# Patient Record
Sex: FEMALE | Race: BLACK OR AFRICAN AMERICAN | NOT HISPANIC OR LATINO | Employment: FULL TIME | URBAN - METROPOLITAN AREA
[De-identification: names, ages, dates, MRNs, and addresses within clinical notes are randomized per-mention and may not be internally consistent; named-entity substitution may affect disease eponyms.]

---

## 2018-12-13 LAB
ABO + RH BLD: NORMAL
HBV SURFACE AG SERPL QL IA: NEGATIVE
HCT VFR BLD AUTO: 33 % (ref 36–46)
HGB BLD-MCNC: 10.2 G/DL (ref 12–16)
HIV 1+2 AB+HIV1 P24 AG SERPL QL IA: NON REACTIVE
INDIRECT COOMBS: NORMAL
MCV RBC AUTO: 73 FL (ref 82–108)
PLATELET # BLD AUTO: 297 K/?L (ref 150–399)
RPR: NON REACTIVE
RUBELLA IMMUNE STATUS: 30.9
URINE CULTURE, ROUTINE: NEGATIVE

## 2019-05-01 LAB
C TRACH RRNA SPEC QL PROBE: NEGATIVE
N GONORRHOEAE, AMPLIFIED DNA: NEGATIVE
TSH SERPL DL<=0.005 MIU/L-ACNC: 1.96 UIU/ML (ref 0.41–5.9)

## 2019-05-07 LAB
GLUCOSE SERPL-MCNC: 90 MG/DL
HCT VFR BLD AUTO: 29 % (ref 36–46)
HGB BLD-MCNC: 8.8 G/DL (ref 12–16)
MCV RBC AUTO: 70 FL (ref 82–108)
PLATELET # BLD AUTO: 271 K/?L (ref 150–399)

## 2019-06-17 ENCOUNTER — TELEPHONE (OUTPATIENT)
Dept: OBSTETRICS AND GYNECOLOGY | Facility: CLINIC | Age: 34
End: 2019-06-17

## 2019-06-17 NOTE — TELEPHONE ENCOUNTER
Ramiro Rubio called saying that she is 34 weeks pregnant and she just moved to [facility]. Pt is wondering if she can come to Dr. Cordero. Pt said she found Dr. Cordero online, was not referred.

## 2019-06-17 NOTE — TELEPHONE ENCOUNTER
Pt is calling, back states that her pervious doctor's office is giving her the run around telling her it will be at least 2 wks for them to send records.Pt would like you to call her back.Pt # [phone]

## 2019-06-25 ENCOUNTER — INITIAL PRENATAL (OUTPATIENT)
Dept: OBSTETRICS AND GYNECOLOGY | Facility: CLINIC | Age: 34
End: 2019-06-25
Attending: OBSTETRICS & GYNECOLOGY
Payer: COMMERCIAL

## 2019-06-25 VITALS
DIASTOLIC BLOOD PRESSURE: 70 MMHG | BODY MASS INDEX: 31.88 KG/M2 | SYSTOLIC BLOOD PRESSURE: 110 MMHG | WEIGHT: 185.75 LBS

## 2019-06-25 DIAGNOSIS — Z3A.35 35 WEEKS GESTATION OF PREGNANCY: ICD-10-CM

## 2019-06-25 DIAGNOSIS — Z34.03 ENCOUNTER FOR SUPERVISION OF NORMAL FIRST PREGNANCY IN THIRD TRIMESTER: Primary | ICD-10-CM

## 2019-06-25 PROCEDURE — 99999 PR PBB SHADOW E&M-EST. PATIENT-LVL III: CPT | Mod: PBBFAC,,, | Performed by: OBSTETRICS & GYNECOLOGY

## 2019-06-25 PROCEDURE — 0502F SUBSEQUENT PRENATAL CARE: CPT | Mod: CPTII,S$GLB,, | Performed by: OBSTETRICS & GYNECOLOGY

## 2019-06-25 PROCEDURE — 99999 PR PBB SHADOW E&M-EST. PATIENT-LVL III: ICD-10-PCS | Mod: PBBFAC,,, | Performed by: OBSTETRICS & GYNECOLOGY

## 2019-06-25 PROCEDURE — 0502F PR SUBSEQUENT PRENATAL CARE: ICD-10-PCS | Mod: CPTII,S$GLB,, | Performed by: OBSTETRICS & GYNECOLOGY

## 2019-06-25 RX ORDER — PRENATAL WITH FERROUS FUM AND FOLIC ACID 3080; 920; 120; 400; 22; 1.84; 3; 20; 10; 1; 12; 200; 27; 25; 2 [IU]/1; [IU]/1; MG/1; [IU]/1; MG/1; MG/1; MG/1; MG/1; MG/1; MG/1; UG/1; MG/1; MG/1; MG/1; MG/1
TABLET ORAL
Refills: 0 | COMMUNITY
Start: 2019-05-14 | End: 2024-03-14

## 2019-06-25 NOTE — PROGRESS NOTES
Patient presents for OB visit with me. Moved from [place]. C/o hands feeling tight; has worn compression gloves with some relief. Has carpal tunnel issues. Also has feet swelling at end of the day; elevates feet which sometimes helps. Lots of fetal movement. Reviewed [facility] [place] [facility]. Reports her previous OB filled out disability forms for her to be on bedrest. Reviewed chart: reason for bedrest or reduction of work not indicated at this time. Discussed with patient that it's ok to work full time while pregnant in the absence of high risk issues. Reports works in [employer] and is on her feet. Advised to stay hydrated, elevate legs when can, try wrist braces for carpal tunnel issues. Verbalized understanding. Will see patient next week for GBBS culture, 3rd trimester labs, consents.

## 2019-06-25 NOTE — PROGRESS NOTES
Patient reports pain in hands. Hard to open and close. Also reports severe edema in both feet. Hard to walk at times

## 2019-07-02 ENCOUNTER — ROUTINE PRENATAL (OUTPATIENT)
Dept: OBSTETRICS AND GYNECOLOGY | Facility: CLINIC | Age: 34
End: 2019-07-02
Attending: OBSTETRICS & GYNECOLOGY
Payer: COMMERCIAL

## 2019-07-02 ENCOUNTER — LAB VISIT (OUTPATIENT)
Dept: LAB | Facility: OTHER | Age: 34
End: 2019-07-02
Attending: OBSTETRICS & GYNECOLOGY
Payer: COMMERCIAL

## 2019-07-02 VITALS — BODY MASS INDEX: 32.18 KG/M2 | DIASTOLIC BLOOD PRESSURE: 68 MMHG | WEIGHT: 187.5 LBS | SYSTOLIC BLOOD PRESSURE: 124 MMHG

## 2019-07-02 DIAGNOSIS — Z34.03 ENCOUNTER FOR SUPERVISION OF NORMAL FIRST PREGNANCY IN THIRD TRIMESTER: Primary | ICD-10-CM

## 2019-07-02 DIAGNOSIS — Z3A.36 36 WEEKS GESTATION OF PREGNANCY: ICD-10-CM

## 2019-07-02 PROCEDURE — 87081 CULTURE SCREEN ONLY: CPT

## 2019-07-02 PROCEDURE — 99999 PR PBB SHADOW E&M-EST. PATIENT-LVL III: CPT | Mod: PBBFAC,,, | Performed by: OBSTETRICS & GYNECOLOGY

## 2019-07-02 PROCEDURE — 86703 HIV-1/HIV-2 1 RESULT ANTBDY: CPT

## 2019-07-02 PROCEDURE — 86592 SYPHILIS TEST NON-TREP QUAL: CPT

## 2019-07-02 PROCEDURE — 0502F SUBSEQUENT PRENATAL CARE: CPT | Mod: CPTII,S$GLB,, | Performed by: OBSTETRICS & GYNECOLOGY

## 2019-07-02 PROCEDURE — 36415 COLL VENOUS BLD VENIPUNCTURE: CPT

## 2019-07-02 PROCEDURE — 99999 PR PBB SHADOW E&M-EST. PATIENT-LVL III: ICD-10-PCS | Mod: PBBFAC,,, | Performed by: OBSTETRICS & GYNECOLOGY

## 2019-07-02 PROCEDURE — 0502F PR SUBSEQUENT PRENATAL CARE: ICD-10-PCS | Mod: CPTII,S$GLB,, | Performed by: OBSTETRICS & GYNECOLOGY

## 2019-07-02 NOTE — PROGRESS NOTES
No new complaints. Still with some swelling. Lots of fetal movement. GBBS, labs, consents done. Labor/bleeding precautions given.

## 2019-07-03 LAB
HIV 1+2 AB+HIV1 P24 AG SERPL QL IA: NEGATIVE
RPR SER QL: NORMAL

## 2019-07-04 LAB — BACTERIA SPEC AEROBE CULT: NORMAL

## 2019-07-08 ENCOUNTER — ROUTINE PRENATAL (OUTPATIENT)
Dept: OBSTETRICS AND GYNECOLOGY | Facility: CLINIC | Age: 34
End: 2019-07-08
Attending: OBSTETRICS & GYNECOLOGY
Payer: COMMERCIAL

## 2019-07-08 VITALS
DIASTOLIC BLOOD PRESSURE: 74 MMHG | WEIGHT: 187.81 LBS | SYSTOLIC BLOOD PRESSURE: 118 MMHG | BODY MASS INDEX: 32.24 KG/M2

## 2019-07-08 DIAGNOSIS — Z34.03 ENCOUNTER FOR SUPERVISION OF NORMAL FIRST PREGNANCY IN THIRD TRIMESTER: Primary | ICD-10-CM

## 2019-07-08 DIAGNOSIS — Z3A.37 37 WEEKS GESTATION OF PREGNANCY: ICD-10-CM

## 2019-07-08 PROCEDURE — 0502F PR SUBSEQUENT PRENATAL CARE: ICD-10-PCS | Mod: CPTII,S$GLB,, | Performed by: OBSTETRICS & GYNECOLOGY

## 2019-07-08 PROCEDURE — 99999 PR PBB SHADOW E&M-EST. PATIENT-LVL II: ICD-10-PCS | Mod: PBBFAC,,, | Performed by: OBSTETRICS & GYNECOLOGY

## 2019-07-08 PROCEDURE — 0502F SUBSEQUENT PRENATAL CARE: CPT | Mod: CPTII,S$GLB,, | Performed by: OBSTETRICS & GYNECOLOGY

## 2019-07-08 PROCEDURE — 99999 PR PBB SHADOW E&M-EST. PATIENT-LVL II: CPT | Mod: PBBFAC,,, | Performed by: OBSTETRICS & GYNECOLOGY

## 2019-07-08 NOTE — PROGRESS NOTES
No complaints. Lost her mucus plug. Lots of fetal movement. Reviewed [facility]. Labor/bleeding precautions given.

## 2019-07-15 ENCOUNTER — TELEPHONE (OUTPATIENT)
Dept: OBSTETRICS AND GYNECOLOGY | Facility: CLINIC | Age: 34
End: 2019-07-15

## 2019-07-15 ENCOUNTER — ROUTINE PRENATAL (OUTPATIENT)
Dept: OBSTETRICS AND GYNECOLOGY | Facility: CLINIC | Age: 34
End: 2019-07-15
Attending: OBSTETRICS & GYNECOLOGY
Payer: COMMERCIAL

## 2019-07-15 VITALS
WEIGHT: 189.25 LBS | DIASTOLIC BLOOD PRESSURE: 70 MMHG | SYSTOLIC BLOOD PRESSURE: 118 MMHG | BODY MASS INDEX: 32.49 KG/M2

## 2019-07-15 DIAGNOSIS — Z34.03 ENCOUNTER FOR SUPERVISION OF NORMAL FIRST PREGNANCY IN THIRD TRIMESTER: Primary | ICD-10-CM

## 2019-07-15 DIAGNOSIS — Z3A.38 38 WEEKS GESTATION OF PREGNANCY: ICD-10-CM

## 2019-07-15 PROCEDURE — 99999 PR PBB SHADOW E&M-EST. PATIENT-LVL II: ICD-10-PCS | Mod: PBBFAC,,, | Performed by: OBSTETRICS & GYNECOLOGY

## 2019-07-15 PROCEDURE — 0502F SUBSEQUENT PRENATAL CARE: CPT | Mod: CPTII,S$GLB,, | Performed by: OBSTETRICS & GYNECOLOGY

## 2019-07-15 PROCEDURE — 0502F PR SUBSEQUENT PRENATAL CARE: ICD-10-PCS | Mod: CPTII,S$GLB,, | Performed by: OBSTETRICS & GYNECOLOGY

## 2019-07-15 PROCEDURE — 99999 PR PBB SHADOW E&M-EST. PATIENT-LVL II: CPT | Mod: PBBFAC,,, | Performed by: OBSTETRICS & GYNECOLOGY

## 2019-07-15 NOTE — PROGRESS NOTES
Had some tightening Saturday night that resolved with rest. Lots of fetal movement. Gave labor/bleeding precautions.

## 2019-07-15 NOTE — TELEPHONE ENCOUNTER
----- Message from Grace Cordero MD sent at 7/15/2019  3:11 PM CDT -----  [facility]KOLAY AND WIL:  PLEASE SEND DATE AND TIME TO [facility] TO PUT ON Remote AND EPIC AND TO [facility] TO PUT ON OB LIST  DON'T FORGET TO CALL PT AND LET HER KNOW ALSO#####          Procedure: answer Y where needed       Induction     Pitocin_Y____     Cytotec____     Balloon____     Other______         Primary____      Repeat____    BTL _____________    Cerclage _________       Indication (elective/other): elective, postdates    Date desired:   Time desired: 4:30 am    Due Date: 19    Cervical exam- (inductions only)   Dilation:            2   Effacement:      70    Station:           -3   Consistency:      soft   Position:   posterior      OSORIO SCORE____________

## 2019-07-19 ENCOUNTER — TELEPHONE (OUTPATIENT)
Dept: OBSTETRICS AND GYNECOLOGY | Facility: CLINIC | Age: 34
End: 2019-07-19

## 2019-07-19 NOTE — TELEPHONE ENCOUNTER
Dr. Cordero OB pt, 39 wks, has been experiencing watery discharge for 2 and half days. Called after hours and they told her it was normal. No blood in discharge, has been going on and off, no tightness no cramping, pt is feeling baby move. Please call pt and advise, thank you

## 2019-07-19 NOTE — TELEPHONE ENCOUNTER
Gil OB- 39w0d. For the past 2 days she has noticed an increase in vaginal discharge. Describes it as watery and clear. No odor. Denies irritation, itching. + fetal movement, no pain or discomfort. No cramping or contractions. Denies feeling any big gushes of fluid or any frequent leakage. Mostly feels it when she goes from sitting to standing.  Reassured pt that this is likely a normal increase in discharge that is common when pregnant. Advised to monitor and change her panty liner frequently and I will return call after speaking to Dr. Cordero

## 2019-07-21 ENCOUNTER — ANESTHESIA EVENT (OUTPATIENT)
Dept: OBSTETRICS AND GYNECOLOGY | Facility: OTHER | Age: 34
End: 2019-07-21
Payer: COMMERCIAL

## 2019-07-21 ENCOUNTER — ANESTHESIA (OUTPATIENT)
Dept: OBSTETRICS AND GYNECOLOGY | Facility: OTHER | Age: 34
End: 2019-07-21
Payer: COMMERCIAL

## 2019-07-21 ENCOUNTER — HOSPITAL ENCOUNTER (INPATIENT)
Facility: OTHER | Age: 34
LOS: 3 days | Discharge: HOME | End: 2019-07-24
Attending: OBSTETRICS & GYNECOLOGY | Admitting: OBSTETRICS & GYNECOLOGY
Payer: COMMERCIAL

## 2019-07-21 DIAGNOSIS — Z3A.39 PREGNANCY WITH 39 COMPLETED WEEKS GESTATION: ICD-10-CM

## 2019-07-21 LAB
ABO + RH BLD: NORMAL
ANISOCYTOSIS BLD QL SMEAR: ABNORMAL
BASOPHILS # BLD AUTO: 0.03 K/UL (ref 0–0.2)
BASOPHILS NFR BLD: 0.5 % (ref 0–1.9)
BLD GP AB SCN CELLS X3 SERPL QL: NORMAL
BLOOD GROUP ANTIBODIES SERPL: NORMAL
DIFFERENTIAL METHOD: ABNORMAL
EOSINOPHIL # BLD AUTO: 0.2 K/UL (ref 0–0.5)
EOSINOPHIL NFR BLD: 2.3 % (ref 0–8)
ERYTHROCYTE [DISTWIDTH] IN BLOOD BY AUTOMATED COUNT: 23.9 % (ref 11.5–14.5)
GIANT PLATELETS BLD QL SMEAR: PRESENT
HCT VFR BLD AUTO: 35.3 % (ref 37–48.5)
HGB BLD-MCNC: 10.6 G/DL (ref 12–16)
IMM GRANULOCYTES # BLD AUTO: 0.04 K/UL (ref 0–0.04)
IMM GRANULOCYTES NFR BLD AUTO: 0.6 % (ref 0–0.5)
LYMPHOCYTES # BLD AUTO: 1.2 K/UL (ref 1–4.8)
LYMPHOCYTES NFR BLD: 17.9 % (ref 18–48)
MCH RBC QN AUTO: 23.3 PG (ref 27–31)
MCHC RBC AUTO-ENTMCNC: 30 G/DL (ref 32–36)
MCV RBC AUTO: 78 FL (ref 82–98)
MONOCYTES # BLD AUTO: 0.6 K/UL (ref 0.3–1)
MONOCYTES NFR BLD: 8.7 % (ref 4–15)
NEUTROPHILS # BLD AUTO: 4.6 K/UL (ref 1.8–7.7)
NEUTROPHILS NFR BLD: 70 % (ref 38–73)
NRBC BLD-RTO: 0 /100 WBC
PLATELET # BLD AUTO: 157 K/UL (ref 150–350)
PLATELET BLD QL SMEAR: ABNORMAL
PMV BLD AUTO: ABNORMAL FL (ref 9.2–12.9)
RBC # BLD AUTO: 4.54 M/UL (ref 4–5.4)
WBC # BLD AUTO: 6.58 K/UL (ref 3.9–12.7)

## 2019-07-21 PROCEDURE — 63600175 PHARM REV CODE 636 W HCPCS: Performed by: OBSTETRICS & GYNECOLOGY

## 2019-07-21 PROCEDURE — 86902 BLOOD TYPE ANTIGEN DONOR EA: CPT

## 2019-07-21 PROCEDURE — 86901 BLOOD TYPING SEROLOGIC RH(D): CPT

## 2019-07-21 PROCEDURE — 25000003 PHARM REV CODE 250: Performed by: OBSTETRICS & GYNECOLOGY

## 2019-07-21 PROCEDURE — 59025 PR FETAL 2N-STRESS TEST: ICD-10-PCS | Mod: 26,,, | Performed by: OBSTETRICS & GYNECOLOGY

## 2019-07-21 PROCEDURE — 85025 COMPLETE CBC W/AUTO DIFF WBC: CPT

## 2019-07-21 PROCEDURE — 86905 BLOOD TYPING RBC ANTIGENS: CPT

## 2019-07-21 PROCEDURE — 99284 EMERGENCY DEPT VISIT MOD MDM: CPT | Mod: 25,,, | Performed by: OBSTETRICS & GYNECOLOGY

## 2019-07-21 PROCEDURE — 99284 PR EMERGENCY DEPT VISIT,LEVEL IV: ICD-10-PCS | Mod: 25,,, | Performed by: OBSTETRICS & GYNECOLOGY

## 2019-07-21 PROCEDURE — 59025 FETAL NON-STRESS TEST: CPT | Mod: 26,,, | Performed by: OBSTETRICS & GYNECOLOGY

## 2019-07-21 PROCEDURE — 86870 RBC ANTIBODY IDENTIFICATION: CPT

## 2019-07-21 PROCEDURE — 11000001 HC ACUTE MED/SURG PRIVATE ROOM

## 2019-07-21 RX ORDER — SODIUM CHLORIDE, SODIUM LACTATE, POTASSIUM CHLORIDE, CALCIUM CHLORIDE 600; 310; 30; 20 MG/100ML; MG/100ML; MG/100ML; MG/100ML
INJECTION, SOLUTION INTRAVENOUS CONTINUOUS
Status: DISCONTINUED | OUTPATIENT
Start: 2019-07-21 | End: 2019-07-22

## 2019-07-21 RX ORDER — OXYTOCIN/RINGER'S LACTATE 20/1000 ML
333 PLASTIC BAG, INJECTION (ML) INTRAVENOUS CONTINUOUS
Status: ACTIVE | OUTPATIENT
Start: 2019-07-21 | End: 2019-07-22

## 2019-07-21 RX ORDER — SODIUM CHLORIDE 9 MG/ML
INJECTION, SOLUTION INTRAVENOUS
Status: DISCONTINUED | OUTPATIENT
Start: 2019-07-21 | End: 2019-07-22

## 2019-07-21 RX ORDER — CEFAZOLIN SODIUM 2 G/50ML
2 SOLUTION INTRAVENOUS
Status: DISCONTINUED | OUTPATIENT
Start: 2019-07-21 | End: 2019-07-22

## 2019-07-21 RX ORDER — OXYTOCIN/RINGER'S LACTATE 20/1000 ML
41.7 PLASTIC BAG, INJECTION (ML) INTRAVENOUS CONTINUOUS
Status: DISCONTINUED | OUTPATIENT
Start: 2019-07-21 | End: 2019-07-22

## 2019-07-21 RX ORDER — ONDANSETRON 8 MG/1
8 TABLET, ORALLY DISINTEGRATING ORAL EVERY 8 HOURS PRN
Status: DISCONTINUED | OUTPATIENT
Start: 2019-07-21 | End: 2019-07-22

## 2019-07-21 RX ORDER — OXYTOCIN/RINGER'S LACTATE 20/1000 ML
2 PLASTIC BAG, INJECTION (ML) INTRAVENOUS CONTINUOUS
Status: DISCONTINUED | OUTPATIENT
Start: 2019-07-21 | End: 2019-07-22

## 2019-07-21 RX ADMIN — SODIUM CHLORIDE, SODIUM LACTATE, POTASSIUM CHLORIDE, AND CALCIUM CHLORIDE: .6; .31; .03; .02 INJECTION, SOLUTION INTRAVENOUS at 07:07

## 2019-07-21 RX ADMIN — OXYTOCIN 2 MILLI-UNITS/MIN: 10 INJECTION, SOLUTION INTRAMUSCULAR; INTRAVENOUS at 07:07

## 2019-07-21 NOTE — H&P
HISTORY AND PHYSICAL                                                OBSTETRICS          Subjective:       Ramiro Rubio is a 33 y.o.  female with IUP at 39w2d weeks gestation who presents to L&D for rupture of membranes about 1 hour ago.  Pt states she had a gush of clear fluid. Pertinent medical history for this pregnancy include routine prenatal care with Dr Cordero.  Patient denies contractions, denies vaginal bleeding, reports LOF.   Fetal Movement: normal.     ROS: 10 systems reviewed and negative other than HPI.     PMHx:   Past Medical History:   Diagnosis Date    Abnormal Pap smear of cervix        PSHx:   Past Surgical History:   Procedure Laterality Date    CRYOABLATION      LYMPH NODE BIOPSY         All: Review of patient's allergies indicates:  No Known Allergies    Meds:   (Not in a hospital admission)    SH:   Social History     Socioeconomic History    Marital status: Single     Spouse name: Not on file    Number of children: Not on file    Years of education: Not on file    Highest education level: Not on file   Occupational History    Not on file   Social Needs    Financial resource strain: Not on file    Food insecurity:     Worry: Not on file     Inability: Not on file    Transportation needs:     Medical: Not on file     Non-medical: Not on file   Tobacco Use    Smoking status: Never Smoker   Substance and Sexual Activity    Alcohol use: Not Currently    Drug use: No    Sexual activity: Yes     Partners: Male     Birth control/protection: None   Lifestyle    Physical activity:     Days per week: Not on file     Minutes per session: Not on file    Stress: Not on file   Relationships    Social connections:     Talks on phone: Not on file     Gets together: Not on file     Attends [employer] service: Not on file     Active member of club or organization: Not on file     Attends meetings of clubs or organizations: Not on file     Relationship status: Not on file   Other  Topics Concern    Not on file   Social History Narrative    Not on file       FH:   Family History   Problem Relation Age of Onset    Breast cancer Neg Hx     Colon cancer Neg Hx     Ovarian cancer Neg Hx        OBHx:   OB History    Para Term  AB Living   2 0 0 0 1 0   SAB TAB Ectopic Multiple Live Births   0 1 0 0 0      # Outcome Date GA Lbr El/2nd Weight Sex Delivery Anes PTL Lv   2 Current            1 TAB                Objective:       /60   Pulse 88   Temp 98.6 °F (37 °C) (Oral)   Resp 18   LMP 10/19/2018   SpO2 100%   Breastfeeding? No     Vitals:    19 1805   BP: 118/60   Pulse: 88   Resp: 18   Temp: 98.6 °F (37 °C)   TempSrc: Oral   SpO2: 100%       General:   alert and cooperative   Lungs:   clear to auscultation bilaterally; nonlabored    Heart:   regular rate and rhythm, 2+ pulses; trace pedal edema   Abdomen:  soft, non-tender; bowel sounds normal; gravid   Extremities negative edema, negative erythema   FHT: 140's Cat 1 (reassuring)                 TOCO: occasional contractions       Cervix:     Dilation: 2cm    Effacement: 75%    Station:  -2    Consistency: soft    Position: anterior     Prenatal record reviewed. GBS negative. Rh+; infection testing negative.   Consents for delivery and blood transfusion reviewed and signed.        Assessment:       39w2d weeks gestation.  With PROM at term    Active Hospital Problems    Diagnosis  POA     premature rupture of membranes with onset of labor within 24 hours of rupture in third trimester [O42.013]  Yes    Full-term premature rupture of membranes with onset of labor within 24 hours of rupture [O42.02]  Yes      Resolved Hospital Problems   No resolved problems to display.          Plan:      Risks, benefits, alternatives and possible complications have been discussed in detail with the patient.   - Consents signed and to chart  - Admit to Labor and Delivery unit  - Epidural per anesthesia.  - admit labs:  CBC, Type & Screen  - will start pitocin

## 2019-07-21 NOTE — ED PROVIDER NOTES
Encounter Date: 2019       History     Chief Complaint   Patient presents with    Rupture of Membranes     Ramiro Rubio is a 33 y.o. [id number] at 39w2d presents complaining of rupture of membranes at approximately 1730. She states that there was initially a small amount a fluid and then a large gush; the fluid has been clear with a red tinge.    This IUP has been uncomplicated. Patient denies contractions, denies vaginal bleeding, reports LOF. Fetal Movement: normal.        Review of patient's allergies indicates:  No Known Allergies  Past Medical History:   Diagnosis Date    Abnormal Pap smear of cervix      Past Surgical History:   Procedure Laterality Date    CRYOABLATION      LYMPH NODE BIOPSY       Family History   Problem Relation Age of Onset    Breast cancer Neg Hx     Colon cancer Neg Hx     Ovarian cancer Neg Hx      Social History     Tobacco Use    Smoking status: Never Smoker   Substance Use Topics    Alcohol use: Not Currently    Drug use: No     Review of Systems   Constitutional: Negative for chills, fatigue and fever.   HENT: Negative for congestion.    Eyes: Negative for visual disturbance.   Respiratory: Negative for cough, chest tightness and shortness of breath.    Cardiovascular: Negative for chest pain and leg swelling.   Gastrointestinal: Negative for abdominal pain, nausea and vomiting.   Genitourinary: Positive for vaginal discharge (Leakage of clear fluid). Negative for dysuria, pelvic pain and vaginal bleeding.   Musculoskeletal: Negative for back pain.   Skin: Negative for color change and rash.   Neurological: Negative for headaches.       Physical Exam     Initial Vitals [19 1805]   BP Pulse Resp Temp SpO2   118/60 88 18 98.6 °F (37 °C) 100 %      MAP       --         Physical Exam    Vitals reviewed.  Constitutional: She appears well-developed and well-nourished. No distress.   HENT:   Head: Normocephalic and atraumatic.   Eyes: Conjunctivae and EOM are normal.    Neck: Normal range of motion. Neck supple.   Cardiovascular: Normal rate, regular rhythm and normal heart sounds.   Pulmonary/Chest: Breath sounds normal. No respiratory distress. She has no wheezes.   Abdominal: Soft. There is no tenderness.   Gravid uterus, size appropriate for dates.   Genitourinary:   Genitourinary Comments:   SSE: grossly ruptured with clear fluid; + valsalva, + pooling, NZT+, ferning+  SVE: 2/70/-3   Musculoskeletal: Normal range of motion. Edema: Trace.   Neurological: She is alert and oriented to person, place, and time.   Skin: Skin is warm and dry.   Psychiatric: She has a normal mood and affect. Her behavior is normal. Judgment and thought content normal.         ED Course   Obtain Fetal nonstress test (NST)  Date/Time: 2019 6:49 PM  Performed by: Georgia De La Rosa MD  Authorized by: Nara Terrell MD     Nonstress Test:     Variability:  6-25 BPM    Decelerations:  None    Accelerations:  15 bpm    Baseline:  145    Uterine Irritability: Yes      Contractions:  Not present  Biophysical Profile:     Nonstress Test Interpretation: reactive      Overall Impression:  Reassuring  Post-procedure:     Patient tolerance:  Patient tolerated the procedure well with no immediate complications      Labs Reviewed   CBC W/ AUTO DIFFERENTIAL   TYPE AND SCREEN LABOR & DELIVERY          Imaging Results    None     BSUS: vertex presentation of fetus       Medical Decision Making:   Initial Assessment:   Ramiro Rubio is a 33 y.o. [id number] at 39w2d presents complaining of rupture of membranes at approximately 1730.    ED Management:   Risks, benefits, alternatives and possible complications have been discussed in detail with the patient.   - Consents signed and to chart  - Admit to Labor and Delivery unit  - BSUS vtx  - Epidural per anesthesia.  - Admit labs: CBC, Type & Screen  - GBS -  - Expectant management on L&D    Other:   I have discussed this case with another health care  provider.                      Clinical Impression:       ICD-10-CM ICD-9-CM   1. Full-term premature rupture of membranes with onset of labor within 24 hours of rupture O42.02 658.10   2. Pregnancy with 39 completed weeks gestation Z3A.39 V22.2            Georgia De La Rosa M.D.  OB/GYN PGY-1                      Georgia De La Rosa MD  Resident  07/21/19 1692

## 2019-07-21 NOTE — ED TRIAGE NOTES
Pt. Presented to the [facility] with complaints of her water breaking around 1725.Pt. States that it was a small amount a fluid and then a large gush, and the fluid had a tinge of red. Pt. Has no other complaints at this time. MD notified. TM.

## 2019-07-22 PROBLEM — Z3A.39 39 WEEKS GESTATION OF PREGNANCY: Status: RESOLVED | Noted: 2019-07-21 | Resolved: 2019-07-22

## 2019-07-22 PROCEDURE — 25000003 PHARM REV CODE 250: Performed by: SURGERY

## 2019-07-22 PROCEDURE — 63600175 PHARM REV CODE 636 W HCPCS: Performed by: OBSTETRICS & GYNECOLOGY

## 2019-07-22 PROCEDURE — 27800517 HC TRAY,EPIDURAL-CONTINUOUS: Performed by: SURGERY

## 2019-07-22 PROCEDURE — 59409 PRA ETRICAL CARE,VAG DELIV ONLY: ICD-10-PCS | Mod: QY,,, | Performed by: ANESTHESIOLOGY

## 2019-07-22 PROCEDURE — 25000003 PHARM REV CODE 250: Performed by: OBSTETRICS & GYNECOLOGY

## 2019-07-22 PROCEDURE — 72100002 HC LABOR CARE, 1ST 8 HOURS

## 2019-07-22 PROCEDURE — 72100003 HC LABOR CARE, EA. ADDL. 8 HRS

## 2019-07-22 PROCEDURE — 72200005 HC VAGINAL DELIVERY LEVEL II

## 2019-07-22 PROCEDURE — 25000003 PHARM REV CODE 250: Performed by: ANESTHESIOLOGY

## 2019-07-22 PROCEDURE — 51702 INSERT TEMP BLADDER CATH: CPT

## 2019-07-22 PROCEDURE — 62326 NJX INTERLAMINAR LMBR/SAC: CPT | Performed by: SURGERY

## 2019-07-22 PROCEDURE — 59409 OBSTETRICAL CARE: CPT | Mod: QY,,, | Performed by: ANESTHESIOLOGY

## 2019-07-22 PROCEDURE — 27200710 HC EPIDURAL INFUSION PUMP SET: Performed by: SURGERY

## 2019-07-22 PROCEDURE — 11000001 HC ACUTE MED/SURG PRIVATE ROOM

## 2019-07-22 RX ORDER — FENTANYL/BUPIVACAINE/NS/PF 2MCG/ML-.1
PLASTIC BAG, INJECTION (ML) INJECTION CONTINUOUS
Status: DISCONTINUED | OUTPATIENT
Start: 2019-07-22 | End: 2019-07-22

## 2019-07-22 RX ORDER — DIPHENHYDRAMINE HCL 25 MG
25 CAPSULE ORAL EVERY 4 HOURS PRN
Status: DISCONTINUED | OUTPATIENT
Start: 2019-07-22 | End: 2019-07-24 | Stop reason: HOSPADM

## 2019-07-22 RX ORDER — FENTANYL/BUPIVACAINE/NS/PF 2MCG/ML-.1
PLASTIC BAG, INJECTION (ML) INJECTION
Status: DISPENSED
Start: 2019-07-22 | End: 2019-07-23

## 2019-07-22 RX ORDER — CARBOPROST TROMETHAMINE 250 UG/ML
250 INJECTION, SOLUTION INTRAMUSCULAR
Status: DISCONTINUED | OUTPATIENT
Start: 2019-07-22 | End: 2019-07-22

## 2019-07-22 RX ORDER — FAMOTIDINE 10 MG/ML
20 INJECTION INTRAVENOUS ONCE
Status: DISCONTINUED | OUTPATIENT
Start: 2019-07-22 | End: 2019-07-22

## 2019-07-22 RX ORDER — MISOPROSTOL 200 UG/1
600 TABLET ORAL
Status: DISCONTINUED | OUTPATIENT
Start: 2019-07-22 | End: 2019-07-22

## 2019-07-22 RX ORDER — ACETAMINOPHEN 325 MG/1
650 TABLET ORAL EVERY 6 HOURS PRN
Status: DISCONTINUED | OUTPATIENT
Start: 2019-07-22 | End: 2019-07-24 | Stop reason: HOSPADM

## 2019-07-22 RX ORDER — FENTANYL/BUPIVACAINE/NS/PF 2MCG/ML-.1
PLASTIC BAG, INJECTION (ML) INJECTION CONTINUOUS PRN
Status: DISCONTINUED | OUTPATIENT
Start: 2019-07-22 | End: 2019-07-24

## 2019-07-22 RX ORDER — DIPHENOXYLATE HYDROCHLORIDE AND ATROPINE SULFATE 2.5; .025 MG/1; MG/1
2 TABLET ORAL EVERY 6 HOURS PRN
Status: DISCONTINUED | OUTPATIENT
Start: 2019-07-22 | End: 2019-07-22

## 2019-07-22 RX ORDER — DIPHENOXYLATE HYDROCHLORIDE AND ATROPINE SULFATE 2.5; .025 MG/1; MG/1
2 TABLET ORAL 4 TIMES DAILY PRN
Status: DISCONTINUED | OUTPATIENT
Start: 2019-07-22 | End: 2019-07-24 | Stop reason: HOSPADM

## 2019-07-22 RX ORDER — OXYTOCIN/RINGER'S LACTATE 20/1000 ML
41.65 PLASTIC BAG, INJECTION (ML) INTRAVENOUS CONTINUOUS
Status: ACTIVE | OUTPATIENT
Start: 2019-07-22 | End: 2019-07-23

## 2019-07-22 RX ORDER — OXYCODONE AND ACETAMINOPHEN 10; 325 MG/1; MG/1
1 TABLET ORAL EVERY 4 HOURS PRN
Status: DISCONTINUED | OUTPATIENT
Start: 2019-07-22 | End: 2019-07-24 | Stop reason: HOSPADM

## 2019-07-22 RX ORDER — BUPIVACAINE HYDROCHLORIDE 2.5 MG/ML
INJECTION, SOLUTION EPIDURAL; INFILTRATION; INTRACAUDAL
Status: DISPENSED
Start: 2019-07-22 | End: 2019-07-22

## 2019-07-22 RX ORDER — METHYLERGONOVINE MALEATE 0.2 MG/ML
200 INJECTION INTRAVENOUS
Status: DISCONTINUED | OUTPATIENT
Start: 2019-07-22 | End: 2019-07-22

## 2019-07-22 RX ORDER — ONDANSETRON 8 MG/1
8 TABLET, ORALLY DISINTEGRATING ORAL EVERY 8 HOURS PRN
Status: DISCONTINUED | OUTPATIENT
Start: 2019-07-22 | End: 2019-07-24 | Stop reason: HOSPADM

## 2019-07-22 RX ORDER — HYDROCORTISONE 25 MG/G
CREAM TOPICAL 3 TIMES DAILY PRN
Status: DISCONTINUED | OUTPATIENT
Start: 2019-07-22 | End: 2019-07-24 | Stop reason: HOSPADM

## 2019-07-22 RX ORDER — MISOPROSTOL 200 UG/1
200 TABLET ORAL EVERY 6 HOURS
Status: COMPLETED | OUTPATIENT
Start: 2019-07-23 | End: 2019-07-23

## 2019-07-22 RX ORDER — SIMETHICONE 80 MG
1 TABLET,CHEWABLE ORAL 3 TIMES DAILY PRN
Status: DISCONTINUED | OUTPATIENT
Start: 2019-07-22 | End: 2019-07-24 | Stop reason: HOSPADM

## 2019-07-22 RX ORDER — DIPHENHYDRAMINE HYDROCHLORIDE 50 MG/ML
25 INJECTION INTRAMUSCULAR; INTRAVENOUS EVERY 4 HOURS PRN
Status: DISCONTINUED | OUTPATIENT
Start: 2019-07-22 | End: 2019-07-24 | Stop reason: HOSPADM

## 2019-07-22 RX ORDER — SODIUM CITRATE AND CITRIC ACID MONOHYDRATE 334; 500 MG/5ML; MG/5ML
30 SOLUTION ORAL ONCE
Status: DISCONTINUED | OUTPATIENT
Start: 2019-07-22 | End: 2019-07-22

## 2019-07-22 RX ORDER — DOCUSATE SODIUM 100 MG/1
200 CAPSULE, LIQUID FILLED ORAL 2 TIMES DAILY PRN
Status: DISCONTINUED | OUTPATIENT
Start: 2019-07-22 | End: 2019-07-24 | Stop reason: HOSPADM

## 2019-07-22 RX ORDER — FENTANYL/BUPIVACAINE/NS/PF 2MCG/ML-.1
PLASTIC BAG, INJECTION (ML) INJECTION
Status: DISPENSED
Start: 2019-07-22 | End: 2019-07-22

## 2019-07-22 RX ORDER — OXYCODONE AND ACETAMINOPHEN 5; 325 MG/1; MG/1
1 TABLET ORAL EVERY 4 HOURS PRN
Status: DISCONTINUED | OUTPATIENT
Start: 2019-07-22 | End: 2019-07-24 | Stop reason: HOSPADM

## 2019-07-22 RX ORDER — MISOPROSTOL 200 UG/1
200 TABLET ORAL EVERY 6 HOURS
Status: DISCONTINUED | OUTPATIENT
Start: 2019-07-22 | End: 2019-07-22

## 2019-07-22 RX ORDER — IBUPROFEN 600 MG/1
600 TABLET ORAL EVERY 6 HOURS PRN
Status: DISCONTINUED | OUTPATIENT
Start: 2019-07-22 | End: 2019-07-24 | Stop reason: HOSPADM

## 2019-07-22 RX ADMIN — Medication 250 ML: at 02:07

## 2019-07-22 RX ADMIN — DIPHENOXYLATE HYDROCHLORIDE AND ATROPINE SULFATE 2 TABLET: 2.5; .025 TABLET ORAL at 06:07

## 2019-07-22 RX ADMIN — Medication 333 MILLI-UNITS/MIN: at 06:07

## 2019-07-22 RX ADMIN — OXYTOCIN 2 MILLI-UNITS/MIN: 10 INJECTION, SOLUTION INTRAMUSCULAR; INTRAVENOUS at 04:07

## 2019-07-22 RX ADMIN — METHYLERGONOVINE MALEATE 200 MCG: 0.2 INJECTION, SOLUTION INTRAMUSCULAR; INTRAVENOUS at 06:07

## 2019-07-22 RX ADMIN — Medication 10 ML/HR: at 12:07

## 2019-07-22 RX ADMIN — MISOPROSTOL 600 MCG: 200 TABLET ORAL at 06:07

## 2019-07-22 RX ADMIN — PROMETHAZINE HYDROCHLORIDE 25 MG: 25 INJECTION INTRAMUSCULAR; INTRAVENOUS at 08:07

## 2019-07-22 RX ADMIN — CARBOPROST TROMETHAMINE 250 MCG: 250 INJECTION, SOLUTION INTRAMUSCULAR at 06:07

## 2019-07-22 RX ADMIN — LIDOCAINE HYDROCHLORIDE AND EPINEPHRINE 3 ML: 15; 5 INJECTION, SOLUTION EPIDURAL at 12:07

## 2019-07-22 RX ADMIN — OXYCODONE HYDROCHLORIDE AND ACETAMINOPHEN 1 TABLET: 10; 325 TABLET ORAL at 08:07

## 2019-07-22 RX ADMIN — DIPHENOXYLATE HYDROCHLORIDE AND ATROPINE SULFATE 2 TABLET: 2.5; .025 TABLET ORAL at 08:07

## 2019-07-22 RX ADMIN — ACETAMINOPHEN 650 MG: 325 TABLET, FILM COATED ORAL at 07:07

## 2019-07-22 NOTE — PROGRESS NOTES
L&D    Patient is an IUP at 39 2/7 weeks in labor. Membranes are ruptured. She is comfortable with her epidural. Pitocin is at 22 mU/min.    P.E.  Gen: AAO x 3, NAD  Vitals:    07/22/19 0829 07/22/19 0834 07/22/19 0839 07/22/19 0844   BP:       Pulse: 83 85 85 85   Resp:       Temp:       TempSrc:       SpO2: 100% 99% 99% 99%   Weight:       Height:         SVE: 6/90/-1  FHTS: 140, moderate variability, no decels, overall reassuring  [place]: q 2 min    A: IUP at 39 2/7 weeks, labor    P: pitocin prn      continue to monitor

## 2019-07-22 NOTE — PROGRESS NOTES
07/22/19 0740   Tele[facility] Note - Strip   Strip Reviewed by [facility] Nurse? Yes   [facility] Note - Communication   [place] Nurse Communicated with Bedside Nurse Regarding: Fetal Status   Tele[facility] Note - Notification   Nurse Notified? Yes   Name of Nurse JIM Brown

## 2019-07-22 NOTE — ANESTHESIA PREPROCEDURE EVALUATION
Ramiro Rubio is a 33 y.o.  female with no significant past medical history who presented with rupture of membranes at approximately 1730.   Patient denies problems with anesthesia, bleeding diatheses, recent blood thinner use.  Platelet count 157.  Patient is interested in having an epidural placed.     OB History    Para Term  AB Living   2 0     1     SAB TAB Ectopic Multiple Live Births     1            # Outcome Date GA Lbr El/2nd Weight Sex Delivery Anes PTL Lv   2 Current            1 TAB                Wt Readings from Last 1 Encounters:   19 1838 81.6 kg (180 lb)       BP Readings from Last 3 Encounters:   19 113/71   19 (!) 101/57   07/15/19 118/70       Patient Active Problem List   Diagnosis    Full-term premature rupture of membranes with onset of labor within 24 hours of rupture    39 weeks gestation of pregnancy       Past Surgical History:   Procedure Laterality Date    CRYOABLATION      LYMPH NODE BIOPSY         Social History     Socioeconomic History    Marital status: Single     Spouse name: Not on file    Number of children: Not on file    Years of education: Not on file    Highest education level: Not on file   Occupational History    Not on file   Social Needs    Financial resource strain: Not on file    Food insecurity:     Worry: Not on file     Inability: Not on file    Transportation needs:     Medical: Not on file     Non-medical: Not on file   Tobacco Use    Smoking status: Never Smoker   Substance and Sexual Activity    Alcohol use: Not Currently    Drug use: No    Sexual activity: Yes     Partners: Male     Birth control/protection: None   Lifestyle    Physical activity:     Days per week: Not on file     Minutes per session: Not on file    Stress: Not on file   Relationships    Social connections:     Talks on phone: Not on file     Gets together: Not on file     Attends Orthodox service: Not on file     Active  member of club or organization: Not on file     Attends meetings of clubs or organizations: Not on file     Relationship status: Not on file   Other Topics Concern    Not on file   Social History Narrative    Not on file         Chemistry        Component Value Date/Time     11/10/2016 1205    K 3.8 11/10/2016 1205     11/10/2016 1205    CO2 25 11/10/2016 1205    BUN 6 11/10/2016 1205    CREATININE 0.7 11/10/2016 1205    GLU 80 11/10/2016 1205        Component Value Date/Time    CALCIUM 8.9 11/10/2016 1205    ALKPHOS 56 11/10/2016 1205    AST 20 11/10/2016 1205    ALT 11 11/10/2016 1205    BILITOT 0.9 11/10/2016 1205    ESTGFRAFRICA >60.0 11/10/2016 1205    EGFRNONAA >60.0 11/10/2016 1205            Lab Results   Component Value Date    WBC 6.58 07/21/2019    HGB 10.6 (L) 07/21/2019    HCT 35.3 (L) 07/21/2019    MCV 78 (L) 07/21/2019     07/21/2019       No results for input(s): PT, INR, PROTIME, APTT in the last 72 hours.      Anesthesia Evaluation    I have reviewed the Patient Summary Reports.    I have reviewed the Nursing Notes.   I have reviewed the Medications.     Review of Systems  Anesthesia Hx:  No previous Anesthesia   Denies Personal Hx of Anesthesia complications.   Social:  Non-Smoker    Hematology/Oncology:     Oncology Normal    -- Denies Anemia:   EENT/Dental:EENT/Dental Normal   Cardiovascular:   Exercise tolerance: good Denies Hypertension.     Pulmonary:   Denies COPD.  Denies Asthma.    Renal/:  Renal/ Normal     Hepatic/GI:  Hepatic/GI Normal    Musculoskeletal:  Musculoskeletal Normal    Neurological:  Neurology Normal Denies TIA. Denies Seizures.    Endocrine:   Denies Diabetes. Denies Hypothyroidism.    Psych:  Psychiatric Normal           Physical Exam  General:  Well nourished    Airway/Jaw/Neck:  Airway Findings: Mouth Opening: Normal General Airway Assessment: Adult  Mallampati: II     Eyes/Ears/Nose:  Eyes/Ears/Nose Findings: EOMI, mucus membranes moist     Dental:  Dental Findings: In tact   Chest/Lungs:  Chest/Lungs Findings: Clear to auscultation, Normal Respiratory Rate     Heart/Vascular:  Heart Findings: Rate: Normal  Rhythm: Regular Rhythm  Heart murmur: negative    Abdomen:  Abdomen Findings: Normal    Musculoskeletal:  Musculoskeletal Findings: Normal   Skin:  Skin Findings: Normal    Mental Status:  Mental Status Findings:  Cooperative, Alert and Oriented         Anesthesia Plan  Type of Anesthesia, risks & benefits discussed:  Anesthesia Type:  CSE, epidural, general, spinal  Patient's Preference:   Intra-op Monitoring Plan: standard ASA monitors  Intra-op Monitoring Plan Comments:   Post Op Pain Control Plan: per primary service following discharge from PACU, IV/PO Opioids PRN, multimodal analgesia, epidural analgesia and intrathecal opioid  Post Op Pain Control Plan Comments:   Induction:   IV  Beta Blocker:  Patient is not currently on a Beta-Blocker (No further documentation required).       Informed Consent: Patient understands risks and agrees with Anesthesia plan.  Questions answered. Anesthesia consent signed with patient.  ASA Score: 2     Day of Surgery Review of History & Physical:    H&P update referred to the surgeon.         Ready For Surgery From Anesthesia Perspective.

## 2019-07-22 NOTE — PROGRESS NOTES
Ramiro foxinucco to do well.  Nurse would like IUPC due to pit at 24 and not seeing contractions    IUPC placed w/o difficulty  7.5/90/-1  Pit at 24    Will continue to monitor, recheck in 2h or prn    Tanna Arriola,

## 2019-07-22 NOTE — PROGRESS NOTES
Labor Progress Note        Subjective:      Patient currently doing well without complaints    Objective:      Temp:  [96.4 °F (35.8 °C)-98.6 °F (37 °C)] 97.8 °F (36.6 °C)  Pulse:  [] 99  Resp:  [18] 18  SpO2:  [98 %-100 %] 99 %  BP: ()/(54-85) 120/69  Body mass index is 30.9 kg/m².     General: no acute distress  Electronic Fetal Monitoring:  FHT: 135 bpm, moderate variability, accelerations absent, decelerations absent   Category: 1                 TOCO: Contractions: regular, every 2-5 minutes     Cervix 9.5/90/-1     Assessment:     1. IUP at 39.3 here for PROM at 1730 yesterday  2. GBS negative  3. Category 2, overall reassuring FHT  4. Epidural: yes  5. IUPC in place     Plan:      1. Continue active management of labor  2. Recheck in 2 hours or prn     Tanna Arriola DO

## 2019-07-22 NOTE — PROGRESS NOTES
Labor Progress Note        Subjective:      Patient currently doing well without complaints    Objective:      Temp:  [96.4 °F (35.8 °C)-98.6 °F (37 °C)] 97.8 °F (36.6 °C)  Pulse:  [71-98] 95  Resp:  [18] 18  SpO2:  [98 %-100 %] 100 %  BP: ()/(54-85) 126/77  Body mass index is 30.9 kg/m².     General: no acute distress  Electronic Fetal Monitoring:  FHT: 130 bpm, moderate variability, accelerations absent, decelerations occ variable  Category: 2, overall reassuring                 TOCO: Contractions: regular, every 2-3 minutes     9/90/-1     Assessment:     1. IUP at 39.3 here for PROM at 1730 yesterday  2. GBS negative  3. Category 2, overall reassuring FHT  4. Epidural: yes  5. IUPC in place     Plan:     1. Continue active management of labor  2. Recheck in 2 hours or prn    Tanna Arriola DO

## 2019-07-22 NOTE — ANESTHESIA PROCEDURE NOTES
Epidural    Patient location during procedure: OB   Reason for block: primary anesthetic   Diagnosis: Intrauterine pregnancy   Start time: 7/22/2019 12:21 AM  Timeout: 7/22/2019 12:20 AM  End time: 7/22/2019 12:35 AM    Staffing  Performing Provider: Darshan Mike MD  Authorizing Provider: Sg Ma MD        Preanesthetic Checklist  Completed: patient identified, site marked, surgical consent, pre-op evaluation, timeout performed, IV checked, risks and benefits discussed, monitors and equipment checked, anesthesia consent given, hand hygiene performed and patient being monitored  Preparation  Patient position: sitting  Prep: ChloraPrep  Patient monitoring: ECG and continuous capnometry  Epidural  Skin Anesthetic: lidocaine 1%  Skin Wheal: 3 mL  Administration type: continuous  Approach: midline  Interspace: L3-4    Needle and Epidural Catheter  Needle type: Tuohy   Needle gauge: 17  Needle length: 3.5 inches  Needle insertion depth: 7 cm  Catheter size: 19 G  Catheter at skin depth: 11 cm  Test dose: 3 mL of lidocaine 1.5% with Epi 1-to-200,000  Additional Documentation: incremental injection, negative aspiration for heme and CSF, no paresthesia on injection, left transient paresthesia, no signs/symptoms of IV or SA injection, no significant pain on injection and no significant complaints from patient  Needle localization: anatomical landmarks  Assessment  Ease of block: easy  Patient's tolerance of the procedure: comfortable throughout blockNo inadvertent dural puncture with Tuohy.  Dural puncture performed with spinal needle.

## 2019-07-22 NOTE — PROGRESS NOTES
LABOR PROGRESS NOTE    S:  MD to bedside for labor check. Complaints: No.  Comfortable with epidural    O: Temp:  [96.4 °F (35.8 °C)-98.6 °F (37 °C)] 96.4 °F (35.8 °C)  Pulse:  [76-98] 78  Resp:  [18] 18  SpO2:  [99 %-100 %] 100 %  BP: (108-134)/(56-85) 114/62      FHT:135 BPM/+ moderate beat to beat variability/+ accels/ no decels Cat 1 (reassuring)  CTX: q 2-4 minutes, pitocin 22  SVE: /-2        ASSESSMENT:   33 y.o.  at 39w3d, for PROM at term     FHT reassuring    Active Hospital Problems    Diagnosis  POA    *Full-term premature rupture of membranes with onset of labor within 24 hours of rupture [O42.02]  Yes    39 weeks gestation of pregnancy [Z3A.39]  Not Applicable      Resolved Hospital Problems   No resolved problems to display.         PLAN:    Labor management  Continue Close Maternal/Fetal Monitoring.   Pitocin Augmentation per protocol,   Recheck 2 hours or PRN      Nara Terrell MD

## 2019-07-22 NOTE — PROGRESS NOTES
Patient transferred to Labor and Delivery via wheelchair.   Patient accompanied by RN and family member. No apparent distress noted.

## 2019-07-23 PROBLEM — D62 ACUTE BLOOD LOSS ANEMIA: Status: ACTIVE | Noted: 2019-07-23

## 2019-07-23 LAB
ANISOCYTOSIS BLD QL SMEAR: ABNORMAL
BASOPHILS # BLD AUTO: 0.04 K/UL (ref 0–0.2)
BASOPHILS NFR BLD: 0.2 % (ref 0–1.9)
DIFFERENTIAL METHOD: ABNORMAL
EOSINOPHIL # BLD AUTO: 0 K/UL (ref 0–0.5)
EOSINOPHIL NFR BLD: 0.2 % (ref 0–8)
ERYTHROCYTE [DISTWIDTH] IN BLOOD BY AUTOMATED COUNT: 23.6 % (ref 11.5–14.5)
GIANT PLATELETS BLD QL SMEAR: PRESENT
HCT VFR BLD AUTO: 29 % (ref 37–48.5)
HGB BLD-MCNC: 8.6 G/DL (ref 12–16)
IMM GRANULOCYTES # BLD AUTO: 0.09 K/UL (ref 0–0.04)
IMM GRANULOCYTES NFR BLD AUTO: 0.5 % (ref 0–0.5)
LYMPHOCYTES # BLD AUTO: 1.5 K/UL (ref 1–4.8)
LYMPHOCYTES NFR BLD: 8.1 % (ref 18–48)
MCH RBC QN AUTO: 23.7 PG (ref 27–31)
MCHC RBC AUTO-ENTMCNC: 29.7 G/DL (ref 32–36)
MCV RBC AUTO: 80 FL (ref 82–98)
MONOCYTES # BLD AUTO: 1.5 K/UL (ref 0.3–1)
MONOCYTES NFR BLD: 8.3 % (ref 4–15)
NEUTROPHILS # BLD AUTO: 15.2 K/UL (ref 1.8–7.7)
NEUTROPHILS NFR BLD: 82.7 % (ref 38–73)
NRBC BLD-RTO: 0 /100 WBC
OVALOCYTES BLD QL SMEAR: ABNORMAL
PLATELET # BLD AUTO: 159 K/UL (ref 150–350)
PLATELET BLD QL SMEAR: ABNORMAL
PMV BLD AUTO: ABNORMAL FL (ref 9.2–12.9)
POIKILOCYTOSIS BLD QL SMEAR: SLIGHT
RBC # BLD AUTO: 3.63 M/UL (ref 4–5.4)
WBC # BLD AUTO: 18.36 K/UL (ref 3.9–12.7)

## 2019-07-23 PROCEDURE — 85025 COMPLETE CBC W/AUTO DIFF WBC: CPT

## 2019-07-23 PROCEDURE — 59400 PR FULL ROUT OBSTE CARE,VAGINAL DELIV: ICD-10-PCS | Mod: GB,,, | Performed by: OBSTETRICS & GYNECOLOGY

## 2019-07-23 PROCEDURE — 99024 POSTOP FOLLOW-UP VISIT: CPT | Mod: ,,, | Performed by: OBSTETRICS & GYNECOLOGY

## 2019-07-23 PROCEDURE — 59400 OBSTETRICAL CARE: CPT | Mod: GB,,, | Performed by: OBSTETRICS & GYNECOLOGY

## 2019-07-23 PROCEDURE — 99024 PR POST-OP FOLLOW-UP VISIT: ICD-10-PCS | Mod: ,,, | Performed by: OBSTETRICS & GYNECOLOGY

## 2019-07-23 PROCEDURE — 25000003 PHARM REV CODE 250: Performed by: OBSTETRICS & GYNECOLOGY

## 2019-07-23 PROCEDURE — 36415 COLL VENOUS BLD VENIPUNCTURE: CPT

## 2019-07-23 PROCEDURE — 11000001 HC ACUTE MED/SURG PRIVATE ROOM

## 2019-07-23 RX ORDER — IBUPROFEN 600 MG/1
600 TABLET ORAL EVERY 6 HOURS PRN
Qty: 60 TABLET | Refills: 2 | Status: SHIPPED | OUTPATIENT
Start: 2019-07-23 | End: 2024-03-14

## 2019-07-23 RX ORDER — PRENATAL WITH FERROUS FUM AND FOLIC ACID 3080; 920; 120; 400; 22; 1.84; 3; 20; 10; 1; 12; 200; 27; 25; 2 [IU]/1; [IU]/1; MG/1; [IU]/1; MG/1; MG/1; MG/1; MG/1; MG/1; MG/1; UG/1; MG/1; MG/1; MG/1; MG/1
1 TABLET ORAL DAILY
Status: DISCONTINUED | OUTPATIENT
Start: 2019-07-23 | End: 2019-07-24 | Stop reason: HOSPADM

## 2019-07-23 RX ORDER — DOCUSATE SODIUM 100 MG/1
200 CAPSULE, LIQUID FILLED ORAL 2 TIMES DAILY PRN
Refills: 0 | COMMUNITY
Start: 2019-07-23 | End: 2024-03-14

## 2019-07-23 RX ORDER — OXYCODONE AND ACETAMINOPHEN 5; 325 MG/1; MG/1
1 TABLET ORAL EVERY 4 HOURS PRN
Qty: 15 TABLET | Refills: 0 | Status: SHIPPED | OUTPATIENT
Start: 2019-07-23 | End: 2019-09-16

## 2019-07-23 RX ADMIN — MISOPROSTOL 200 MCG: 200 TABLET ORAL at 06:07

## 2019-07-23 RX ADMIN — IBUPROFEN 600 MG: 600 TABLET ORAL at 11:07

## 2019-07-23 RX ADMIN — MISOPROSTOL 200 MCG: 200 TABLET ORAL at 12:07

## 2019-07-23 RX ADMIN — MISOPROSTOL 200 MCG: 200 TABLET ORAL at 05:07

## 2019-07-23 RX ADMIN — PRENATAL VIT W/ FE FUMARATE-FA TAB 27-0.8 MG 1 TABLET: 27-0.8 TAB at 05:07

## 2019-07-23 RX ADMIN — OXYCODONE HYDROCHLORIDE AND ACETAMINOPHEN 1 TABLET: 10; 325 TABLET ORAL at 12:07

## 2019-07-23 RX ADMIN — IBUPROFEN 600 MG: 600 TABLET ORAL at 06:07

## 2019-07-23 RX ADMIN — OXYCODONE HYDROCHLORIDE AND ACETAMINOPHEN 1 TABLET: 10; 325 TABLET ORAL at 02:07

## 2019-07-23 RX ADMIN — IBUPROFEN 600 MG: 600 TABLET ORAL at 05:07

## 2019-07-23 RX ADMIN — IBUPROFEN 600 MG: 600 TABLET ORAL at 12:07

## 2019-07-23 RX ADMIN — OXYCODONE HYDROCHLORIDE AND ACETAMINOPHEN 1 TABLET: 10; 325 TABLET ORAL at 04:07

## 2019-07-23 RX ADMIN — OXYCODONE HYDROCHLORIDE AND ACETAMINOPHEN 1 TABLET: 10; 325 TABLET ORAL at 08:07

## 2019-07-23 RX ADMIN — SIMETHICONE CHEW TAB 80 MG 80 MG: 80 TABLET ORAL at 10:07

## 2019-07-23 NOTE — DISCHARGE INSTRUCTIONS
Breastfeeding Discharge Instructions       Feed the baby at the earliest sign of hunger or comfort  o Hands to mouth, sucking motions  o Rooting or searching for something to suck on  o Dont wait for crying - it is a sign of distress     The feedings may be 8-12 times per 24hrs and will not follow a schedule   Avoid pacifiers and bottles for the first 4 weeks   Alternate the breast you start the feeding with, or start with the breast that feels the fullest   Switch breasts when the baby takes himself off the breast or falls asleep   Keep offering breasts until the baby looks full, no longer gives hunger signs, and stays asleep when placed on his back in the crib   If the baby is sleepy and wont wake for a feeding, put the baby skin-to-skin dressed in a diaper against the mothers bare chest   Sleep near your baby   The baby should be positioned and latched on to the breast correctly  o Chest-to-chest, chin in the breast  o Babys lips are flipped outward  o Babys mouth is stretched open wide like a shout  o Babys sucking should feel like tugging to the mother  - The baby should be drinking at the breast:  o You should hear swallowing or gulping throughout the feeding  o You should see milk on the babys lips when he comes off the breast  o Your breasts should be softer when the baby is finished feeding  o The baby should look relaxed at the end of feedings  o After the 4th day and your milk is in:  o The babys poop should turn bright yellow and be loose, watery, and seedy  o The baby should have at least 3-4 poops the size of the palm of your hand per day  o The baby should have at least 5-6 wet diapers per day  o The urine should be light yellow in color  You should drink when you are thirsty and eat a healthy diet when you are    hungry.     Take naps to get the rest you need.   Take medications and/or drink alcohol only with permission of your obstetrician    or the babys pediatrician.  You can  also call the [facility],   ([phone]), Monday-Friday, 8am-5pm Central time, to get the most   up-to-date evidence-based information on the use of medications during   pregnancy and breastfeeding.      The baby should be examined by a pediatrician at 3-5 days of age.  Once your   milk comes in, the baby should be gaining at least ½ - 1oz each day and should be back to birthweight no later than 10-14 days of age.          Community Resources    [facility] Breastfeeding Warmline: [phone]   Local [facility] clinics: provide incentives and breastpumps to eligible mothers  [employer]he [employer] (LLLI):  mother-to-mother support group website        www.[employer].Protecode  Local La Leche League mother-to-mother support groups:        www.Local Magnet        La Leche League [facility]   Dr. Andres Ledesma website for latch videos and general information:        www.breastfeedinginc.ca  [facility] is a call center that provides information about the safety of taking medications while breastfeeding.  Call 1-673.295.5717, M-F, 8am-5pm, [place].  International Lactation Consultant Association provides resources for assistance:        www.ilca.org  [place] Breastfeeding Coalition provides informationand resources for parents  and the community    www.LaBreastfeedingSupport.org     Onelia Ward is a mom-to-mom support group:                             www.nolanesting.Bharat [employer]rimony//breastfeedng-support/  Part[employer] for Healthy Babies:  9-812-585-BABY(5948)  [employer]: a breastfeeding support group for women of color, 979.181.4539          Preparation and Hygiene:    1. Shower daily.  2. Wear a clean bra each day and wash daily in warm soapy water.  3. Change wet or moist breast pads frequently.  Moist pads can promote growth of germs.  4. Actively wash your hands, paying close attention to the area around and under your fingernails, thoroughly with soap and water for 15 seconds before pumping or handling  your milk.  Re-wash your hands if you touch anything (scratching your nose, answering the phone, etc) while pumping or handling your milk.   5. Before pumping your breasts, assemble the pump collection kit and have ready the sterile container and labels.  Place these items on a clean surface next to the breastpump.  6. Each time after you have finished pumping, take apart all of the parts of the breastpump collection kit and place them in a separate cleaning container (do not place them in the sink).  Be sure to remove the yellow valve from the breastshield and separate the white membrane from the yellow valve.  Rinse all of these parts with cool water.  Then use a new sponge and/or bottle brush and dishwashing detergent to clean the parts.  Rinse off the soapy water with cool water and air dry on a clean towel covered with a clean cloth.  All parts may also be washed after each use in the top rack of a .  7. Once each day, sterilize all of the parts of the breastpump collection kit.  This can be done by boiling the kit parts for 10 minutes or by using a Quick Clean Micro-Steam Bag made by [employer]  8. If condensation appears in the tubing, continue to run the pump with the tubing attached for 1-2 minutes or until the tubing is dry.   9. Notify your babys nurse or doctor if you become ill or need to take any medication, even over-the-counter medicines.        Collection and Storage of Expressed Breastmilk:         1. Pump your breasts at least 8-10 times every 24 hours.  Double pump (both breasts at  the same time) for at least 15-20 minutes using the most suction that is comfortable.    2. Write the date and time of pumping and the name of any medications you are takingon the babys pre-printed hospital identification label.   3. Place your babys pre-printed hospital identification label on each container of breastmilk.  Additional pre-printed labels can be obtained from your babys nurse.  If your  expressed breastmilk is not correctly labeled, the nurse cannot feed the milk to the baby.       4. Place a brightly colored sticker on the top of each container of milk pumped during the first 30 days.  This identifies the milk as special and having higher levels of nutrients and anti-infective properties that are so important for your baby.  Additional stickers can be obtained from the lactation consultants or your babys nurse.  5.   Do not touch the inside of the storage containers or lids.  6.      Pour the amount of expressed milk needed for 1 of your babys feedings into each   storage container. Use a new container(s) for each pumping.  Additional storage   containers can be obtained from your babys nurse.        7.       Tightly screw the lid onto the container and place immediately into the       refrigerator fordaily transportation to the hospital.   Do not freeze your milk      unless asked to do so by your babys nurse.  However, if you are not able to      visit your baby each day, place the expressed breastmilk in the freezer.  8.   Expressed breastmilk should be refrigerated or frozen within 1 hour of      pumping.  9.      Do not store expressed breastmilk on the door of your refrigerator or freezer where the temperature is warmer.         Transportation of Expressed Breastmilk:    1. Refrigerated breastmilk or frozen milk should be packed tightly together in a cooler with frozen, blue gel-packs to keep the milk frozen.  DO NOT USE ICE CUBES (WET ICE) TO TRANSPORT FROZEN MILK.   A clean towel can be used to fill any extra space between containers of frozen milk.  2.    Bring your expressed milk from home each time you visit the baby.          Nipple Shield Instructions for use:   Wash hands prior to touching the shield   Moisten the edge of the nipple shield with water or lanolin before applying to help it stay in place   Turn shield shelter inside out and center over nipple and  areola   Slowly roll shield over the nipple and areola and smooth down edges.  The cut-out portion of the contact nipple shield should be positioned under the babys nose.   Hand express a little milk into the teat to facilitate latch.   Latch baby onto breast and shield so that part of the areola is in the babys mouth.  Do not latch baby only onto the teat of the shield.   Breastfeed  until baby is content   After breastfeeding with a nipple shield, mother should pump breasts for  15-20min using the most comfortable suction to maximize milk removal and to protect the milk supply.  This should be continued until the milk supply is fully established and the infant is consistently  gaining weight.     Continued use of, and or weaning from the use of, the nipple shield should be done under the supervision of a health care provider.    Cleaning and Sanitizing:   After each use, rinse in cool water to remove breastmilk   Wash in warm, soapy water   Rinse with clear water   Sanitize daily by following the instructions on [employer] Quick Clean Micro-Steam bag or by boiling for 10min.  The nipple shield should not rest on the bottom or sides of the pot while boiling.   Allow nipple shield to air dry in a clean area and store dry with the nipple facing upward

## 2019-07-23 NOTE — SUBJECTIVE & OBJECTIVE
Hospital course: PPD#1: Feeling well, denies complaints      Interval History:     She is doing well this morning. She is tolerating a regular diet without nausea or vomiting. She is voiding spontaneously. She is ambulating. She has passed flatus, and has not a BM. Vaginal bleeding is mild. She denies fever or chills. Abdominal pain is mild and controlled with oral medications.     Objective:     Vital Signs (Most Recent):  Temp: 99.6 °F (37.6 °C) (07/23/19 0033)  Pulse: 98 (07/23/19 0033)  Resp: 17 (07/23/19 0033)  BP: 128/60 (07/23/19 0033)  SpO2: 100 % (07/23/19 0033) Vital Signs (24h Range):  Temp:  [97.8 °F (36.6 °C)-99.6 °F (37.6 °C)] 99.6 °F (37.6 °C)  Pulse:  [] 98  Resp:  [16-17] 17  SpO2:  [93 %-100 %] 100 %  BP: (115-139)/(55-86) 128/60     Weight: 81.6 kg (180 lb)  Body mass index is 30.9 kg/m².      Intake/Output Summary (Last 24 hours) at 7/23/2019 0904  Last data filed at 7/23/2019 0000  Gross per 24 hour   Intake 1998.07 ml   Output 3250 ml   Net -1251.93 ml       Significant Labs:  Lab Results   Component Value Date    GROUPTRH O POS 07/21/2019    HEPBSAG Negative 12/13/2018    STREPBCULT No Group B Streptococcus isolated 07/02/2019     Recent Labs   Lab 07/23/19  0555   HGB 8.6*   HCT 29.0*       I have personallly reviewed all pertinent lab results from the last 24 hours.    Physical Exam:   Constitutional: She is oriented to person, place, and time. She appears well-developed and well-nourished. No distress.    HENT:   Head: Normocephalic and atraumatic.      Cardiovascular: Normal rate, regular rhythm, normal heart sounds and intact distal pulses.     Pulmonary/Chest: Effort normal. No respiratory distress.        Abdominal: Soft. Bowel sounds are normal. She exhibits no mass.   Firm fundus below umbilicus             Musculoskeletal: Normal range of motion and moves all extremeties. She exhibits edema (symmetric to knee, neg erythema, neg Joseph's sign).       Neurological: She is alert  and oriented to person, place, and time.    Skin: Skin is warm. She is not diaphoretic.    Psychiatric: She has a normal mood and affect. Her behavior is normal. Judgment and thought content normal.

## 2019-07-23 NOTE — PROGRESS NOTES
Called by nurse, now vomiting and diarrhea.   Will give phenergan and immidium    Tanna Arriola, DO

## 2019-07-23 NOTE — PROGRESS NOTES
Pt feeling better.  Still with some diarrhea.    HR trending down, 110's now.      Vitals:    07/22/19 1750 07/22/19 1810 07/22/19 1820 07/22/19 1830   BP: 132/61 117/62 (!) 121/55    Pulse: 100 (!) 155 (!) 126 91   Resp:    16   Temp:    97.8 °F (36.6 °C)   TempSrc:       SpO2: 100% 100% 100% (!) 93%   Weight:       Height:         NAD  Minimal vaginal bleeding     Will continue routine pp care    Tanna Arriola DO

## 2019-07-23 NOTE — NURSING
"Mother was taught hand expression of breastmilk/colostrum. She was instructed to:   Sit upright and lean forward, if possible.   When feasible, apply warm, wet compress over breasts for a few minutes.    Perform gentle breast massage.   Form a C with her hand and place it about 1 inch back from the areola with the nipple centered between her index finger and her thumb.   Press, compress, relax:  Using her finger and thumb, apply pressure in an inward direction toward the breast without stretching the tissue, compress the breast tissue between her finger and thumb, then relax her finger and thumb. Repeat process for a few minutes.   Rotate placement of finger and thumb on the breasts to facilitate emptying.   Collect expressed breastmilk/colostrum with a spoon or cup and feed immediately to the baby, if able.   If unable to feed immediately, place breastmilk/colostrum directly into a sterile storage container for later use. Place the babys breast milk label (with the date and time of collection and the names of mother's medications) on the container. Reviewed proper handling and storage of expressed breastmilk.   Patient effectively verbalized understanding, due to maternal indication, RN hand expressed breast. Pt states she does "not want to do this" and requests formula, further education attempted.   "

## 2019-07-23 NOTE — ASSESSMENT & PLAN NOTE
s/p uterotonics  Acute blood loss anemia due to intrapartum blood loss. The patient is asymptomatic of the anemia - no intervention is indicated.  Restart PNV/iron with stool softner on DC.

## 2019-07-23 NOTE — PROGRESS NOTES
Instructed on the risks of formula feeding including:   Lacks the nutrients found in colostrums to help prevent infection, mature the gut, aid in digestion and resist allergies   Contains artificial additives and preservatives which increases incidence of contamination   Increase spitting up due to slower digestion   Increased cost and requires preparation, including bottle sanitation and formula refrigeration   Increased incidence of NEC for the  baby   Increased risk of diabetes with family history, SIDS and ear infections   Skipped feedings for the breastfeeding mother increases chance of engorgement, mastitis and plugged ducts   Decreases breastfeeding babys appetite resulting in poor feeding session, decreased breast stimulation and poor milk supply   Exposes the breastfeeding baby to the possibility of allergic reactions and colic  Pt states understanding and verbalized appropriate recall.      Instructed on the risks of early pacifier or artificial nipple use, including:   Decreased milk supply due to decreased breast stimulation from delayed or skipped feedings with pacifier use   Nipple confusion due to the promotion of non-nutritive sucking on the pacifier/nipple   Increased nipple soreness due to non-nutritive sucking   Skipped feedings the increases chance of engorgement, mastitis and plugged ducts   Decreases the duration of exclusive breastfeeding  States understanding and verbalized appropriate recall.    Discussed the desired feeding choice with the patient.  Reviewed the benefits of breastfeeding and the risks of formula feeding. States understanding of all information and verbalized appropriate recall.

## 2019-07-23 NOTE — PROGRESS NOTES
[facility]  Obstetrics  Postpartum Progress Note    Patient Name: Ramiro Rubio  MRN: [id number]  Admission Date: 7/21/2019  Hospital Length of Stay: 2 days  Attending Physician: Grace Cordero MD  Primary Care Provider: Primary Doctor No    Subjective:     Principal Problem:Full-term premature rupture of membranes with onset of labor within 24 hours of rupture    Hospital course: Vaginal delivery was complicated by EBL 800cc and need for uterotonic medications.  PPD#1: Denies LH/dizzy. C/o lower leg edema       Interval History:     She is doing well this morning. She is tolerating a regular diet without nausea or vomiting. She is voiding spontaneously. She is ambulating. She has passed flatus, and has not a BM. Vaginal bleeding is mild. She denies fever or chills. Abdominal pain is mild and controlled with oral medications.     Objective:     Vital Signs (Most Recent):  Temp: 99.6 °F (37.6 °C) (07/23/19 0033)  Pulse: 98 (07/23/19 0033)  Resp: 17 (07/23/19 0033)  BP: 128/60 (07/23/19 0033)  SpO2: 100 % (07/23/19 0033) Vital Signs (24h Range):  Temp:  [97.8 °F (36.6 °C)-99.6 °F (37.6 °C)] 99.6 °F (37.6 °C)  Pulse:  [] 98  Resp:  [16-17] 17  SpO2:  [93 %-100 %] 100 %  BP: (115-139)/(55-86) 128/60     Weight: 81.6 kg (180 lb)  Body mass index is 30.9 kg/m².      Intake/Output Summary (Last 24 hours) at 7/23/2019 0904  Last data filed at 7/23/2019 0000  Gross per 24 hour   Intake 1998.07 ml   Output 3250 ml   Net -1251.93 ml       Significant Labs:  Lab Results   Component Value Date    GROUPTRH O POS 07/21/2019    HEPBSAG Negative 12/13/2018    STREPBCULT No Group B Streptococcus isolated 07/02/2019     Recent Labs   Lab 07/23/19  0555   HGB 8.6*   HCT 29.0*       I have personallly reviewed all pertinent lab results from the last 24 hours.    Physical Exam:   Constitutional: She is oriented to person, place, and time. She appears well-developed and well-nourished. No distress.    HENT:    Head: Normocephalic and atraumatic.      Cardiovascular: Normal rate, regular rhythm, normal heart sounds and intact distal pulses.     Pulmonary/Chest: Effort normal. No respiratory distress.        Abdominal: Soft. Bowel sounds are normal. She exhibits no mass.   Firm fundus below umbilicus             Musculoskeletal: Normal range of motion and moves all extremeties. She exhibits edema (symmetric to knee, neg erythema, neg Joseph's sign).       Neurological: She is alert and oriented to person, place, and time.    Skin: Skin is warm. She is not diaphoretic.    Psychiatric: She has a normal mood and affect. Her behavior is normal. Judgment and thought content normal.       Assessment/Plan:     33 y.o. female  for:    Acute blood loss anemia   s/p uterotonics  Acute blood loss anemia due to intrapartum blood loss. The patient is asymptomatic of the anemia - no intervention is indicated.  Restart PNV/iron with stool softner on DC.      Vaginal delivery  Routine post partum care  Reassured patient re: LLE. No e/o DVT      Disposition: As patient meets milestones, will plan to discharge PPD#2.    Karena White MD  Obstetrics  [facility]

## 2019-07-23 NOTE — ANESTHESIA POSTPROCEDURE EVALUATION
Anesthesia Post Evaluation    Patient: Ramiro Rubio    Procedure(s) Performed: * No procedures listed *    Final Anesthesia Type: epidural  Patient location during evaluation: floor  Patient participation: Yes- Able to Participate  Level of consciousness: awake and alert and oriented  Post-procedure vital signs: reviewed and stable  Pain management: adequate  Airway patency: patent  PONV status at discharge: No PONV  Anesthetic complications: no      Cardiovascular status: blood pressure returned to baseline and hemodynamically stable  Respiratory status: unassisted  Hydration status: euvolemic  Follow-up not needed.          Vitals Value Taken Time   /83 7/23/2019  8:00 AM   Temp 37.2 °C (99 °F) 7/23/2019  8:00 AM   Pulse 113 7/23/2019  8:00 AM   Resp 18 7/23/2019  8:00 AM   SpO2 100 % 7/23/2019  8:00 AM         No case tracking events are documented in the log.      Pain/Ila Score: Pain Rating Prior to Med Admin: 6 (7/23/2019 12:54 PM)  Pain Rating Post Med Admin: 2 (7/23/2019  9:30 AM)

## 2019-07-23 NOTE — PROGRESS NOTES
Pt evaluated due to pain and HR in 160's  HR now in 120's  Pain worse at fundus, when pushing on it    Otherwise, pt stable.   Will get stat CBC and continue to monitor    Tanna Arriola, DO

## 2019-07-23 NOTE — L&D DELIVERY NOTE
[facility]  Vaginal Delivery   Operative Note    SUMMARY     Normal spontaneous vaginal delivery of live infant, was placed on mothers abdomen for skin to skin and bulb suctioning performed.  Infant delivered position RIGOBERTO over perineum.  Nuchal cord: No.    Spontaneous delivery of placenta and IV pitocin given noting uterine atony with bleeding. Cytotec 800 mcg per rectum, Methergine 0.2 mg IM, Hemabate 250 mcg IM were given and uterine massage was performed with subsequent resolution of atony.     2nd degree laceration noted and repaired in normal fashion with 2-0, 3-0 vicryl   .  Patient tolerated delivery well. Sponge needle and lap counted correctly x2.    Indications: Full-term premature rupture of membranes with onset of labor within 24 hours of rupture  Pregnancy complicated by:   Patient Active Problem List   Diagnosis    Vaginal delivery    Full-term premature rupture of membranes with onset of labor within 24 hours of rupture    Acute blood loss anemia     Admitting GA: Unknown    Delivery Information for  Kelley Rubio    Birth information:  YOB: 2017   Time of birth: 6:11 PM   Sex: female   Head Delivery Date/Time: 7/22/2019  6:11 PM   Delivery type: Vaginal, Spontaneous   Gestational Age: 39w3d    Delivery Providers    Delivering clinician:  Grace Cordero MD   Provider Role    Tanna Casanova RN Registered Nurse    Leonie Frost, RN Registered Nurse    Leigha A [facility] Surgical Tech    Heavenly Rodriguez RN Registered Nurse            Measurements    Weight:  3544 g  Length:  7 cm  Head circumference:  36.8 cm  Chest circumference:  34.3 cm         Apgars    Living status:  Living  Apgars:   1 min.:   5 min.:   10 min.:   15 min.:   20 min.:     Skin color:   1  1       Heart rate:   2  2       Reflex irritability:   2  2       Muscle tone:   2  2       Respiratory effort:   2  2       Total:   9  9              Operative Delivery    Forceps  attempted?:  No  Vacuum extractor attempted?:  No         Shoulder Dystocia    Shoulder dystocia present?:  No           Presentation    Presentation:  Vertex  Position:  Left Occiput Anterior           Interventions/Resuscitation    Method:  Tactile Stimulation, Bulb Suctioning       Cord    Vessels:  3 vessels  Complications:  None  Delayed Cord Clamping?:  Yes  Cord Clamped Date/Time:  2019  6:12 PM  Cord Blood Disposition:  Sent with Baby, Lab  Gases Sent?:  No  Stem Cell Collection (by MD):  No       Placenta    Placenta delivery date/time:  2019 1820  Placenta removal:  Spontaneous  Placenta appearance:  Intact  Placenta disposition:  discarded           Labor Events:       labor: No     Labor Onset Date/Time:         Dilation Complete Date/Time:         Start Pushing Date/Time:       Rupture Date/Time:              Rupture type:           Fluid Amount:        Fluid Color:        Fluid Odor:        Membrane Status (PeriCalm): SRM (Spontaneous Rupture)      Rupture Date/Time (PeriCalm): 2019 17:30:00      Fluid Amount (PeriCalm): Moderate      Fluid Color (PeriCalm): Clear       steroids: None     Antibiotics given for GBS: No     Induction:       Indications for induction:        Augmentation: oxytocin     Indications for augmentation: Ineffective Contraction Pattern     Labor complications: None     Additional complications:          Cervical ripening:                     Delivery:      Episiotomy: None     Indication for Episiotomy:       Perineal Lacerations: 2nd Repaired:  Yes   Periurethral Laceration:   Repaired:     Labial Laceration:   Repaired:     Sulcus Laceration:   Repaired:     Vaginal Laceration:   Repaired:     Cervical Laceration:   Repaired:     Repair suture:       Repair # of packets: 2     Last Value - EBL - Nursing (mL):       Sum - EBL - Nursing (mL): 0     Last Value - EBL - Anesthesia (mL):      Calculated QBL (mL): 750      Vaginal Sweep Performed: Yes      Surgicount Correct: Yes       Other providers:       Anesthesia    Method:  Epidural          Details (if applicable):  Trial of Labor      Categorization:      Priority:     Indications for :     Incision Type:       Additional  information:  Forceps:    Vacuum:    Breech:    Observed anomalies    Other (Comments):

## 2019-07-23 NOTE — LACTATION NOTE
"   07/23/19 1320   Breasts WDL   Breast WDL WDL   Breast Pumping   Breast Pumping double electric breast pump utilized   Breast Pumping Interventions post-feed pumping encouraged   Maternal Feeding Assessment   Maternal Emotional State assist needed;relaxed   Infant Positioning cross-cradle   Latch Assistance yes   Comfort Measures Before/During Feeding infant position adjusted;latch adjusted;maternal position adjusted   Reproductive Interventions   Breast Care: Breastfeeding open to air   Breastfeeding Assistance assisted with positioning;assisted with techniques for flat/inverted nipples;feeding cue recognition promoted;feeding on demand promoted;feeding session observed;infant latch-on verified;infant suck/swallow verified;nipple shell utilized;nipple shield utilized;supplemental feeding provided;support offered   Breastfeeding Support diary/feeding log utilized;encouragement provided;infant-mother separation minimized;lactation counseling provided;maternal hydration promoted;maternal nutrition promoted;maternal rest encouraged   lactation education reviewed. Pt giving formula because "she doesn't have milk". Lc reviewed supply and demand and benefits of colostrum. Pt states that she is giving formula via bottle but she would like to breast feed. Maternal nipples flat and firm. Few drops expressible. Infant pulls tongue to roof of mouth and pushes nipple out the mouth, does not sustain latch. Nipple shield utilized to sustain latch, good tug and pulls with shield. Encouraged breast compression. Breast pump initiated, ;use and care reviewed.   Plan: mother will nurse the baby using the nipple shield  Father will offer the supplement ( maternal request)   Mother will pump both breast for 15 minuets  Continue to feed on cue and follow basic education.   "

## 2019-07-24 VITALS
SYSTOLIC BLOOD PRESSURE: 114 MMHG | DIASTOLIC BLOOD PRESSURE: 68 MMHG | WEIGHT: 180 LBS | TEMPERATURE: 98 F | BODY MASS INDEX: 30.73 KG/M2 | RESPIRATION RATE: 18 BRPM | OXYGEN SATURATION: 100 % | HEIGHT: 64 IN | HEART RATE: 70 BPM

## 2019-07-24 LAB
ANISOCYTOSIS BLD QL SMEAR: ABNORMAL
BASOPHILS # BLD AUTO: 0.04 K/UL (ref 0–0.2)
BASOPHILS NFR BLD: 0.4 % (ref 0–1.9)
DIFFERENTIAL METHOD: ABNORMAL
EOSINOPHIL # BLD AUTO: 0.2 K/UL (ref 0–0.5)
EOSINOPHIL NFR BLD: 2.1 % (ref 0–8)
ERYTHROCYTE [DISTWIDTH] IN BLOOD BY AUTOMATED COUNT: 23.7 % (ref 11.5–14.5)
GIANT PLATELETS BLD QL SMEAR: PRESENT
HCT VFR BLD AUTO: 27.5 % (ref 37–48.5)
HGB BLD-MCNC: 8.2 G/DL (ref 12–16)
IMM GRANULOCYTES # BLD AUTO: 0.05 K/UL (ref 0–0.04)
IMM GRANULOCYTES NFR BLD AUTO: 0.5 % (ref 0–0.5)
LYMPHOCYTES # BLD AUTO: 1.9 K/UL (ref 1–4.8)
LYMPHOCYTES NFR BLD: 17.8 % (ref 18–48)
MCH RBC QN AUTO: 23.8 PG (ref 27–31)
MCHC RBC AUTO-ENTMCNC: 29.8 G/DL (ref 32–36)
MCV RBC AUTO: 80 FL (ref 82–98)
MONOCYTES # BLD AUTO: 0.7 K/UL (ref 0.3–1)
MONOCYTES NFR BLD: 6.8 % (ref 4–15)
NEUTROPHILS # BLD AUTO: 7.8 K/UL (ref 1.8–7.7)
NEUTROPHILS NFR BLD: 72.4 % (ref 38–73)
NRBC BLD-RTO: 0 /100 WBC
OVALOCYTES BLD QL SMEAR: ABNORMAL
PLATELET # BLD AUTO: 183 K/UL (ref 150–350)
PLATELET BLD QL SMEAR: ABNORMAL
PMV BLD AUTO: ABNORMAL FL (ref 9.2–12.9)
POIKILOCYTOSIS BLD QL SMEAR: SLIGHT
RBC # BLD AUTO: 3.44 M/UL (ref 4–5.4)
TOXIC GRANULES BLD QL SMEAR: PRESENT
WBC # BLD AUTO: 10.71 K/UL (ref 3.9–12.7)

## 2019-07-24 PROCEDURE — 36415 COLL VENOUS BLD VENIPUNCTURE: CPT

## 2019-07-24 PROCEDURE — 99024 POSTOP FOLLOW-UP VISIT: CPT | Mod: ,,, | Performed by: OBSTETRICS & GYNECOLOGY

## 2019-07-24 PROCEDURE — 25000003 PHARM REV CODE 250: Performed by: OBSTETRICS & GYNECOLOGY

## 2019-07-24 PROCEDURE — 99024 PR POST-OP FOLLOW-UP VISIT: ICD-10-PCS | Mod: ,,, | Performed by: OBSTETRICS & GYNECOLOGY

## 2019-07-24 PROCEDURE — 85025 COMPLETE CBC W/AUTO DIFF WBC: CPT

## 2019-07-24 RX ADMIN — IBUPROFEN 600 MG: 600 TABLET ORAL at 06:07

## 2019-07-24 NOTE — PLAN OF CARE
Problem: Adult Inpatient Plan of Care  Goal: Plan of Care Review  Outcome: Outcome(s) achieved Date Met: 07/24/19  Pt ambulating, voiding, and passing flatus. Pt tolerating po well. No s/s of distress at this time. Pt pain well controlled by oral pain medication. Pt bleeding light throughout shift and fundus is firm. Discharge education provided. Verbalized understanding. Rooming in with baby.

## 2019-07-24 NOTE — ASSESSMENT & PLAN NOTE
s/p uterotonics  Acute blood loss anemia due to intrapartum blood loss. The patient is asymptomatic of the anemia - no intervention is indicated.  Restart PNV/iron with stool softner on DC.  + fundal ttp.  Unsure if this is due to aggressive uterine massage.  Pt afebrile, will repeat CBC this AM prior to D/C to ensure WBC trending down.  18 yesterday

## 2019-07-24 NOTE — SUBJECTIVE & OBJECTIVE
Hospital course: PPD#1: c/o bilateral, symmetric lower leg swelling  PPD#2: Pt doing well, no major complaints.  Believes that leg swelling is improving    She is doing well this morning. She is tolerating a regular diet without nausea or vomiting. She is voiding spontaneously. She is ambulating. She has passed flatus, and has not a BM. Vaginal bleeding is mild. She denies fever or chills. Abdominal pain is mild and controlled with oral medications. She is breast and bottle feeding.     Objective:     Vital Signs (Most Recent):  Temp: 98.4 °F (36.9 °C) (07/24/19 0800)  Pulse: 70 (07/24/19 0800)  Resp: 18 (07/24/19 0800)  BP: 114/68 (07/24/19 0800)  SpO2: 100 % (07/24/19 0800) Vital Signs (24h Range):  Temp:  [97.5 °F (36.4 °C)-98.4 °F (36.9 °C)] 98.4 °F (36.9 °C)  Pulse:  [70-96] 70  Resp:  [18] 18  SpO2:  [99 %-100 %] 100 %  BP: (110-128)/(65-74) 114/68     Weight: 81.6 kg (180 lb)  Body mass index is 30.9 kg/m².    No intake or output data in the 24 hours ending 07/24/19 0823    Significant Labs:  Lab Results   Component Value Date    GROUPTRH O POS 07/21/2019    HEPBSAG Negative 12/13/2018    STREPBCULT No Group B Streptococcus isolated 07/02/2019     Recent Labs   Lab 07/23/19  0555   HGB 8.6*   HCT 29.0*       I have personallly reviewed all pertinent lab results from the last 24 hours.    Physical Exam:   Constitutional: She is oriented to person, place, and time. She appears well-developed and well-nourished. No distress.    HENT:   Head: Normocephalic and atraumatic.       Pulmonary/Chest: Effort normal. No respiratory distress.        Abdominal: Soft. She exhibits no distension. There is no tenderness. There is no rebound and no guarding.   Fundus firm, tender, below umbilicus               Musculoskeletal: She exhibits edema (right slightly more elevated than left, neg homans.  ).       Neurological: She is alert and oriented to person, place, and time.    Skin: Skin is warm and dry. She is not  diaphoretic.    Psychiatric: She has a normal mood and affect.

## 2019-07-24 NOTE — DISCHARGE SUMMARY
[facility]  Obstetrics  Discharge Summary      Patient Name: Ramiro Rubio  MRN: [id number]  Admission Date: 7/21/2019  Hospital Length of Stay: 3 days  Discharge Date and Time:  07/24/2019 10:42 AM  Attending Physician: Grace Cordero MD   Discharging Provider: Tanna Arriola DO   Primary Care Provider: Primary Doctor No    HPI: Patient underwent uncomplicated vaginal delivery on 7/22        * No surgery found *     Hospital Course:   PPD#1: c/o bilateral, symmetric lower leg swelling  PPD#2: Pt doing well, no major complaints.  Believes that leg swelling is improving     Consults (From admission, onward)        Status Ordering Provider     Inpatient consult to Anesthesiology  Once     Provider:  (Not yet assigned)    TANNA Cordova          Final Active Diagnoses:    Diagnosis Date Noted POA    Acute blood loss anemia [D62] 07/23/2019 No    Vaginal delivery [O80] 07/22/2019 Not Applicable      Problems Resolved During this Admission:    Diagnosis Date Noted Date Resolved POA    PRINCIPAL PROBLEM:  Full-term premature rupture of membranes with onset of labor within 24 hours of rupture [O42.02] 07/21/2019 07/22/2019 Yes    Full-term premature rupture of membranes with onset of labor within 24 hours of rupture [O42.02] 07/22/2019 07/24/2019 Yes    39 weeks gestation of pregnancy [Z3A.39] 07/21/2019 07/22/2019 Not Applicable        Feeding Method: both breast and bottle    Immunizations     Date Immunization Status Dose Route/Site Given by    07/22/19 1917 MMR Incomplete 0.5 mL Subcutaneous/Left deltoid     07/22/19 1917 Tdap Incomplete 0.5 mL Intramuscular/Left deltoid           Delivery:    Episiotomy: None   Lacerations: 2nd   Repair suture:     Repair # of packets: 2   Blood loss (ml):       Birth information:  YOB: 2017   Time of birth: 6:11 PM   Sex: female   Delivery type: Vaginal, Spontaneous   Gestational Age: 39w3d    Delivery Clinician:       Other providers:       Additional  information:  Forceps:    Vacuum:    Breech:    Observed anomalies      Living?:           APGARS  One minute Five minutes Ten minutes   Skin color:         Heart rate:         Grimace:         Muscle tone:         Breathing:         Totals: 9  9        Placenta: Delivered:       appearance    Pending Diagnostic Studies:     None          Discharged Condition: good    Disposition: Home or Self Care    Follow Up:  Follow-up Information     Grace Cordero MD In 6 weeks.    Specialties:  Gynecology, Obstetrics, Obstetrics and Gynecology  Why:  Post Partum exam  Contact information:  [address]  [address]  [facility] [id number] [phone]                 Patient Instructions:      Call MD for:  temperature >100.4     Call MD for:  persistent nausea and vomiting or diarrhea     Call MD for:  severe uncontrolled pain     Call MD for:  redness, tenderness, or signs of infection (pain, swelling, redness, odor or green/yellow discharge around incision site)     No dressing needed     Other restrictions (specify):   Order Comments: Pelvic rest x 6 weeks     Activity as tolerated     Shower on day dressing removed (No bath)     Medications:  Current Discharge Medication List      START taking these medications    Details   docusate sodium (COLACE) 100 MG capsule Take 2 capsules (200 mg total) by mouth 2 (two) times daily as needed for Constipation.  Refills: 0      ibuprofen (ADVIL,MOTRIN) 600 MG tablet Take 1 tablet (600 mg total) by mouth every 6 (six) hours as needed (cramping).  Qty: 60 tablet, Refills: 2      oxyCODONE-acetaminophen (PERCOCET) 5-325 mg per tablet Take 1 tablet by mouth every 4 (four) hours as needed.  Qty: 15 tablet, Refills: 0         CONTINUE these medications which have NOT CHANGED    Details   PRENATAL VITAMIN PLUS LOW IRON 27 mg iron- 1 mg Tab Refills: 0             Tanna Arriola DO  Obstetrics  [facility]

## 2019-07-24 NOTE — PLAN OF CARE
Problem: Breastfeeding  Goal: Effective Breastfeeding  Outcome: Outcome(s) achieved Date Met: 07/24/19  Pt to follow lactation discharge education to nurse, pump, supplement with EBM or formula per maternal preference.

## 2019-07-24 NOTE — PROGRESS NOTES
[facility]  Obstetrics  Postpartum Progress Note    Patient Name: Ramiro Rubio  MRN: [id number]  Admission Date: 7/21/2019  Hospital Length of Stay: 3 days  Attending Physician: Grace Cordero MD  Primary Care Provider: Primary Doctor No    Subjective:     Principal Problem:Full-term premature rupture of membranes with onset of labor within 24 hours of rupture    Hospital course: PPD#1: c/o bilateral, symmetric lower leg swelling  PPD#2: Pt doing well, no major complaints.  Believes that leg swelling is improving    She is doing well this morning. She is tolerating a regular diet without nausea or vomiting. She is voiding spontaneously. She is ambulating. She has passed flatus, and has not a BM. Vaginal bleeding is mild. She denies fever or chills. Abdominal pain is mild and controlled with oral medications. She is breast and bottle feeding.     Objective:     Vital Signs (Most Recent):  Temp: 98.4 °F (36.9 °C) (07/24/19 0800)  Pulse: 70 (07/24/19 0800)  Resp: 18 (07/24/19 0800)  BP: 114/68 (07/24/19 0800)  SpO2: 100 % (07/24/19 0800) Vital Signs (24h Range):  Temp:  [97.5 °F (36.4 °C)-98.4 °F (36.9 °C)] 98.4 °F (36.9 °C)  Pulse:  [70-96] 70  Resp:  [18] 18  SpO2:  [99 %-100 %] 100 %  BP: (110-128)/(65-74) 114/68     Weight: 81.6 kg (180 lb)  Body mass index is 30.9 kg/m².    No intake or output data in the 24 hours ending 07/24/19 0823    Significant Labs:  Lab Results   Component Value Date    GROUPTRH O POS 07/21/2019    HEPBSAG Negative 12/13/2018    STREPBCULT No Group B Streptococcus isolated 07/02/2019     Recent Labs   Lab 07/23/19  0555   HGB 8.6*   HCT 29.0*       I have personallly reviewed all pertinent lab results from the last 24 hours.    Physical Exam:   Constitutional: She is oriented to person, place, and time. She appears well-developed and well-nourished. No distress.    HENT:   Head: Normocephalic and atraumatic.       Pulmonary/Chest: Effort normal. No respiratory  distress.        Abdominal: Soft. She exhibits no distension. There is no tenderness. There is no rebound and no guarding.   Fundus firm, tender, below umbilicus               Musculoskeletal: She exhibits edema (right slightly more elevated than left, neg homans.  ).       Neurological: She is alert and oriented to person, place, and time.    Skin: Skin is warm and dry. She is not diaphoretic.    Psychiatric: She has a normal mood and affect.       Assessment/Plan:     33 y.o. female  for:    Acute blood loss anemia   s/p uterotonics  Acute blood loss anemia due to intrapartum blood loss. The patient is asymptomatic of the anemia - no intervention is indicated.  Restart PNV/iron with stool softner on DC.  + fundal ttp.  Unsure if this is due to aggressive uterine massage.  Pt afebrile, will repeat CBC this AM prior to D/C to ensure WBC trending down.  18 yesterday      Vaginal delivery  Routine post partum care  Asked pt to inform us of worsening swelling or pain in right let        Disposition: As patient meets milestones, will plan to discharge today pending CBC.    Tanna Arriola, DO  Obstetrics  [facility]

## 2019-07-24 NOTE — LACTATION NOTE
"   19 1015   Breast Pumping   Breast Pumping other (see comments)  (encouraged to pump 8 or more times in 24hrs)   Lactation Referrals   Lactation Referrals other (see comments)  (lactation warmline)   Discharge lactation education reviewed with breastfeeding guide. Pt has not pumped since yesterday, plans to "start back up today." Pt has been formula feeding via  Bottle per preference. Pt concerned baby is constipated because she has not stooled in 12hrs, reviewed normal  stomach size and expected output for 2nd day of life. Pt reports feeding 50ml formula over an hour [id number]. Education provided regarding belly size and large volume given. Reviewed patient's feeding goals, pt wants to breastfeed and bottle feed. Encouraged pt to call for assistance to latch baby, then have support person bottle feed under phototherapy while mom pumps. Reviewed pumping education including to pump 8 or more times a day, cleaning parts, sterilizing daily, milk storage/handling. Pt has [employer] pump for home use. LC number on board. Baby staying overnight for phototherapy. D/c tomorrow.   "

## 2019-07-28 ENCOUNTER — NURSE TRIAGE (OUTPATIENT)
Dept: ADMINISTRATIVE | Facility: CLINIC | Age: 34
End: 2019-07-28

## 2019-07-28 NOTE — TELEPHONE ENCOUNTER
Reason for Disposition   Fever > 103 F (39.4 C)    Protocols used: POSTPARTUM - FEVER-A-AH     T102.9 having lower back and abd pain. body is constantly aching. Sx started today. rec ED. Call back with questions

## 2019-07-29 NOTE — TELEPHONE ENCOUNTER
Spoke with pt and she states she did not go to the er yesterday.  She took ibuprofen yesterday and her fever broke.  Pt states she no longer has fever and back pain anymore. Informed her to call the office if needed.  Pt verbalized understanding.

## 2019-08-06 ENCOUNTER — TELEPHONE (OUTPATIENT)
Dept: OBSTETRICS AND GYNECOLOGY | Facility: OTHER | Age: 34
End: 2019-08-06

## 2019-08-06 NOTE — TELEPHONE ENCOUNTER
Patient states that her and baby are doing well. Pt has been primarily pumping, however, baby has latched today and is doing well. Patient states that she has occasional soreness that is well controlled with pain meds. Patient has no new questions or concerns at this time.

## 2019-09-16 ENCOUNTER — POSTPARTUM VISIT (OUTPATIENT)
Dept: OBSTETRICS AND GYNECOLOGY | Facility: CLINIC | Age: 34
End: 2019-09-16
Attending: OBSTETRICS & GYNECOLOGY
Payer: COMMERCIAL

## 2019-09-16 VITALS
BODY MASS INDEX: 27.67 KG/M2 | DIASTOLIC BLOOD PRESSURE: 84 MMHG | HEIGHT: 64 IN | WEIGHT: 162.06 LBS | SYSTOLIC BLOOD PRESSURE: 118 MMHG

## 2019-09-16 PROCEDURE — 99999 PR PBB SHADOW E&M-EST. PATIENT-LVL II: CPT | Mod: PBBFAC,,, | Performed by: OBSTETRICS & GYNECOLOGY

## 2019-09-16 PROCEDURE — 99999 PR PBB SHADOW E&M-EST. PATIENT-LVL II: ICD-10-PCS | Mod: PBBFAC,,, | Performed by: OBSTETRICS & GYNECOLOGY

## 2019-09-16 PROCEDURE — 0503F POSTPARTUM CARE VISIT: CPT | Mod: S$GLB,,, | Performed by: OBSTETRICS & GYNECOLOGY

## 2019-09-16 PROCEDURE — 0503F PR POSTPARTUM CARE VISIT: ICD-10-PCS | Mod: S$GLB,,, | Performed by: OBSTETRICS & GYNECOLOGY

## 2019-09-16 NOTE — PROGRESS NOTES
"33 y.o. female for postpartum visit. Her delivery records were reviewed.  She is breast feeding infant. She reports that she gives one bottle of formula each night. Her left breast produces less milk and sometimes gets hard. She plans to use condoms for contraception; she is in a long distance relationship with her boyfriend. Her period started today.    Exam:  General - well appearing, no apparent distress  Vitals:    09/16/19 1125   BP: 118/84   Weight: 73.5 kg (162 lb 0.6 oz)   Height: 5' 4" (1.626 m)     Abdomen - soft, non tender, non distended   Incision - none.  Pelvic - normal external genitalia, any lacerations well healed               uterus non tender, appropriately sized, blood c/w menses  Extremeties - no edema    A: encounter for postpartum assessment    P:  return in 6 weeks for annual  - advised to use warm wet heat to soften left breast prior to feeding or pumping. Start on left side to be sure is drained adequately each time.    "

## 2019-11-05 ENCOUNTER — OFFICE VISIT (OUTPATIENT)
Dept: OBSTETRICS AND GYNECOLOGY | Facility: CLINIC | Age: 34
End: 2019-11-05
Attending: OBSTETRICS & GYNECOLOGY
Payer: MEDICAID

## 2019-11-05 VITALS
DIASTOLIC BLOOD PRESSURE: 66 MMHG | HEIGHT: 64 IN | WEIGHT: 161.81 LBS | SYSTOLIC BLOOD PRESSURE: 110 MMHG | BODY MASS INDEX: 27.63 KG/M2

## 2019-11-05 DIAGNOSIS — Z01.419 ENCOUNTER FOR GYNECOLOGICAL EXAMINATION: Primary | ICD-10-CM

## 2019-11-05 DIAGNOSIS — Z12.4 ENCOUNTER FOR PAPANICOLAOU SMEAR FOR CERVICAL CANCER SCREENING: ICD-10-CM

## 2019-11-05 DIAGNOSIS — Z11.51 SCREENING FOR HPV (HUMAN PAPILLOMAVIRUS): ICD-10-CM

## 2019-11-05 PROCEDURE — 99213 OFFICE O/P EST LOW 20 MIN: CPT | Mod: PBBFAC | Performed by: OBSTETRICS & GYNECOLOGY

## 2019-11-05 PROCEDURE — 99999 PR PBB SHADOW E&M-EST. PATIENT-LVL III: ICD-10-PCS | Mod: PBBFAC,,, | Performed by: OBSTETRICS & GYNECOLOGY

## 2019-11-05 PROCEDURE — 99999 PR PBB SHADOW E&M-EST. PATIENT-LVL III: CPT | Mod: PBBFAC,,, | Performed by: OBSTETRICS & GYNECOLOGY

## 2019-11-05 PROCEDURE — 88175 CYTOPATH C/V AUTO FLUID REDO: CPT

## 2019-11-05 PROCEDURE — 99395 PR PREVENTIVE VISIT,EST,18-39: ICD-10-PCS | Mod: S$PBB,,, | Performed by: OBSTETRICS & GYNECOLOGY

## 2019-11-05 PROCEDURE — 87624 HPV HI-RISK TYP POOLED RSLT: CPT

## 2019-11-05 PROCEDURE — 99395 PREV VISIT EST AGE 18-39: CPT | Mod: S$PBB,,, | Performed by: OBSTETRICS & GYNECOLOGY

## 2019-11-05 NOTE — PROGRESS NOTES
Subjective:       Patient ID: Ramiro Rubio is a 33 y.o. female.    Chief Complaint:  Well Woman (pap nl  )      History of Present Illness  - here for annual. Breastfeeding; baby isn't latching well for the last two weeks and is taking a bottle with no problem. Patient would like her to latch. Not sexually active.    Past Medical History:   Diagnosis Date    Abnormal Pap smear of cervix        Past Surgical History:   Procedure Laterality Date    CRYOABLATION      LYMPH NODE BIOPSY           Current Outpatient Medications:     ibuprofen (ADVIL,MOTRIN) 600 MG tablet, Take 1 tablet (600 mg total) by mouth every 6 (six) hours as needed (cramping)., Disp: 60 tablet, Rfl: 2    PRENATAL VITAMIN PLUS LOW IRON 27 mg iron- 1 mg Tab, , Disp: , Rfl: 0    docusate sodium (COLACE) 100 MG capsule, Take 2 capsules (200 mg total) by mouth 2 (two) times daily as needed for Constipation., Disp: , Rfl: 0    Review of patient's allergies indicates:  No Known Allergies    GYN & OB History  Patient's last menstrual period was 10/15/2019.   Date of Last Pap: No result found    OB History    Para Term  AB Living   3 1     1 1   SAB TAB Ectopic Multiple Live Births     1   0 1      # Outcome Date GA Lbr El/2nd Weight Sex Delivery Anes PTL Lv   3 Para 19   3.544 kg (7 lb 13 oz) F Vag-Spont EPI N [place]O   2             1 TAB                Social History     Socioeconomic History    Marital status: Single     Spouse name: Not on file    Number of children: Not on file    Years of education: Not on file    Highest education level: Not on file   Occupational History    Not on file   Social Needs    Financial resource strain: Not on file    Food insecurity:     Worry: Not on file     Inability: Not on file    Transportation needs:     Medical: Not on file     Non-medical: Not on file   Tobacco Use    Smoking status: Never Smoker    Smokeless tobacco: Never Used   Substance and Sexual Activity     "Alcohol use: Not Currently    Drug use: No    Sexual activity: Not Currently     Partners: Male     Birth control/protection: None   Lifestyle    Physical activity:     Days per week: Not on file     Minutes per session: Not on file    Stress: Not on file   Relationships    Social connections:     Talks on phone: Not on file     Gets together: Not on file     Attends Alevism service: Not on file     Active member of club or organization: Not on file     Attends meetings of clubs or organizations: Not on file     Relationship status: Not on file   Other Topics Concern    Not on file   Social History Narrative    Not on file       Family History   Problem Relation Age of Onset    Breast cancer Neg Hx     Colon cancer Neg Hx     Ovarian cancer Neg Hx        Review of Systems  Review of Systems   Respiratory: Negative for shortness of breath.    Cardiovascular: Negative for chest pain and palpitations.   Gastrointestinal: Negative for blood in stool, nausea and vomiting.   Genitourinary:        - see HPI   Skin: Negative for rash and wound.   Allergic/Immunologic: Negative for immunocompromised state.   Neurological: Negative for dizziness and syncope.   Hematological: Negative for adenopathy.   Psychiatric/Behavioral: Negative for behavioral problems.        Objective:     Vitals:    11/05/19 1118   BP: 110/66   Weight: 73.4 kg (161 lb 13.1 oz)   Height: 5' 4" (1.626 m)       Physical Exam:   Constitutional: She is oriented to person, place, and time. She appears well-developed and well-nourished.        Pulmonary/Chest: Right breast exhibits no mass, no nipple discharge, no skin change, no tenderness and no swelling. Left breast exhibits no mass, no nipple discharge, no skin change, no tenderness and no swelling. Breasts are symmetrical.        Abdominal: Soft. She exhibits no distension. There is no tenderness.     Genitourinary: Vagina normal and uterus normal. There is no tenderness or lesion on the " right labia. There is no tenderness or lesion on the left labia. Cervix is normal. Right adnexum displays no mass, no tenderness and no fullness. Left adnexum displays no mass, no tenderness and no fullness. No vaginal discharge found. Additional cervical findings: pap smear done          Musculoskeletal: Moves all extremeties.       Neurological: She is alert and oriented to person, place, and time.     Psychiatric: She has a normal mood and affect.        Assessment/ Plan:     Orders Placed This Encounter    HPV High Risk Genotypes, PCR    Liquid-based pap smear, screening       Ramiro WAYNE was seen today for well woman.    Diagnoses and all orders for this visit:    Encounter for gynecological examination    Encounter for Papanicolaou smear for cervical cancer screening  -     Liquid-based pap smear, screening    Screening for HPV (human papillomavirus)  -     HPV High Risk Genotypes, PCR    - gave number for lactation consultants. Advised patient to make an appt for her and the baby.    Follow up in about 1 year (around 11/5/2020) for annual exam.

## 2019-11-07 LAB
HPV HR 12 DNA CVX QL NAA+PROBE: NEGATIVE
HPV16 AG SPEC QL: NEGATIVE
HPV18 DNA SPEC QL NAA+PROBE: NEGATIVE

## 2020-07-12 ENCOUNTER — PATIENT MESSAGE (OUTPATIENT)
Dept: OBSTETRICS AND GYNECOLOGY | Facility: CLINIC | Age: 35
End: 2020-07-12

## 2020-07-13 RX ORDER — FLUCONAZOLE 150 MG/1
150 TABLET ORAL DAILY
Qty: 2 TABLET | Refills: 0 | Status: SHIPPED | OUTPATIENT
Start: 2020-07-13 | End: 2020-07-14

## 2020-07-13 NOTE — TELEPHONE ENCOUNTER
Pt requesting Rx for Diflucan due to positive home test for yeast infection. C/o itching and irritation. Pharmacy is utd and will have pt schedule appt if no improvement after Rx is completed.

## 2021-04-15 ENCOUNTER — PATIENT MESSAGE (OUTPATIENT)
Dept: RESEARCH | Facility: HOSPITAL | Age: 36
End: 2021-04-15

## 2024-02-28 ENCOUNTER — OFFICE VISIT (OUTPATIENT)
Dept: OBSTETRICS AND GYNECOLOGY | Facility: CLINIC | Age: 39
End: 2024-02-28
Payer: MEDICAID

## 2024-02-28 VITALS — WEIGHT: 163.13 LBS | BODY MASS INDEX: 28 KG/M2 | SYSTOLIC BLOOD PRESSURE: 110 MMHG | DIASTOLIC BLOOD PRESSURE: 68 MMHG

## 2024-02-28 DIAGNOSIS — L02.224 BOIL OF INGUINAL REGION: ICD-10-CM

## 2024-02-28 DIAGNOSIS — Z01.419 WELL WOMAN EXAM WITH ROUTINE GYNECOLOGICAL EXAM: Primary | ICD-10-CM

## 2024-02-28 DIAGNOSIS — N39.41 URGE INCONTINENCE OF URINE: ICD-10-CM

## 2024-02-28 DIAGNOSIS — N93.0 POSTCOITAL BLEEDING: ICD-10-CM

## 2024-02-28 DIAGNOSIS — N89.8 VAGINAL DISCHARGE: ICD-10-CM

## 2024-02-28 PROCEDURE — 1160F RVW MEDS BY RX/DR IN RCRD: CPT | Mod: CPTII,,, | Performed by: OBSTETRICS & GYNECOLOGY

## 2024-02-28 PROCEDURE — 99999 PR PBB SHADOW E&M-EST. PATIENT-LVL III: CPT | Mod: PBBFAC,,, | Performed by: OBSTETRICS & GYNECOLOGY

## 2024-02-28 PROCEDURE — 3008F BODY MASS INDEX DOCD: CPT | Mod: CPTII,,, | Performed by: OBSTETRICS & GYNECOLOGY

## 2024-02-28 PROCEDURE — 99213 OFFICE O/P EST LOW 20 MIN: CPT | Mod: PBBFAC | Performed by: OBSTETRICS & GYNECOLOGY

## 2024-02-28 PROCEDURE — 88175 CYTOPATH C/V AUTO FLUID REDO: CPT | Performed by: OBSTETRICS & GYNECOLOGY

## 2024-02-28 PROCEDURE — 87624 HPV HI-RISK TYP POOLED RSLT: CPT | Performed by: OBSTETRICS & GYNECOLOGY

## 2024-02-28 PROCEDURE — 81514 NFCT DS BV&VAGINITIS DNA ALG: CPT | Performed by: OBSTETRICS & GYNECOLOGY

## 2024-02-28 PROCEDURE — 3074F SYST BP LT 130 MM HG: CPT | Mod: CPTII,,, | Performed by: OBSTETRICS & GYNECOLOGY

## 2024-02-28 PROCEDURE — 1159F MED LIST DOCD IN RCRD: CPT | Mod: CPTII,,, | Performed by: OBSTETRICS & GYNECOLOGY

## 2024-02-28 PROCEDURE — 99385 PREV VISIT NEW AGE 18-39: CPT | Mod: S$PBB,,, | Performed by: OBSTETRICS & GYNECOLOGY

## 2024-02-28 PROCEDURE — 3078F DIAST BP <80 MM HG: CPT | Mod: CPTII,,, | Performed by: OBSTETRICS & GYNECOLOGY

## 2024-02-28 RX ORDER — BENZOCAINE 20 G/100G
1 OINTMENT TOPICAL 2 TIMES DAILY
Qty: 28 G | Refills: 0 | Status: SHIPPED | OUTPATIENT
Start: 2024-02-28

## 2024-02-28 NOTE — PROGRESS NOTES
History & Physical  Gynecology      SUBJECTIVE:     Chief Complaint: Annual Exam       History of Present Illness:  Annual Exam-Premenopausal  Ms. Rubio is a 39 y/o female who presents for annual exam. The patient reports that since delivering her daughter in 2019 she has been having leakage of urine. She reports that if she does not void immediately when she feels the urge she will leak. She also reports that the last time she had intercourse she had bleeding for 4-5 days. Her periods are normal and monthly. She has spotting for the first 3 days then bleeding for 4 days. She has also been having vaginal discharge for years but was told that she simply have copious normal discharge. She reports that she has to wear pads daily and changes them throughout the day because of the discharge.     The patient is not currently sexually active. GYN screening history: last pap: was normal. The patient wears seatbelts: yes. The patient participates in regular exercise: no. Has the patient ever been transfused or tattooed?: not asked. The patient reports that there is not domestic violence in her life.      Review of patient's allergies indicates:  No Known Allergies    Past Medical History:   Diagnosis Date    Abnormal Pap smear of cervix      Past Surgical History:   Procedure Laterality Date    CRYOABLATION      LYMPH NODE BIOPSY       OB History          3    Para   1    Term                AB   1    Living   1         SAB        IAB   1    Ectopic        Multiple   0    Live Births   1               Family History   Problem Relation Age of Onset    Breast cancer Neg Hx     Colon cancer Neg Hx     Ovarian cancer Neg Hx      Social History     Tobacco Use    Smoking status: Never    Smokeless tobacco: Never   Substance Use Topics    Alcohol use: Not Currently    Drug use: No       Current Outpatient Medications   Medication Sig    benzocaine (BOIL RELIEF) 20 % Oint Apply 1 g topically 2 (two) times a day.     docusate sodium (COLACE) 100 MG capsule Take 2 capsules (200 mg total) by mouth 2 (two) times daily as needed for Constipation.    ibuprofen (ADVIL,MOTRIN) 600 MG tablet Take 1 tablet (600 mg total) by mouth every 6 (six) hours as needed (cramping).    PRENATAL VITAMIN PLUS LOW IRON 27 mg iron- 1 mg Tab      No current facility-administered medications for this visit.         Review of Systems:  Review of Systems   Constitutional:  Negative for chills and fever.   Eyes:  Negative for visual disturbance.   Respiratory:  Negative for cough and wheezing.    Cardiovascular:  Negative for chest pain and palpitations.   Gastrointestinal:  Negative for abdominal pain, nausea and vomiting.   Genitourinary:  Positive for urgency, urinary incontinence and postcoital bleeding. Negative for dysuria, frequency, hematuria, pelvic pain, vaginal bleeding, vaginal discharge and vaginal pain.   Neurological:  Negative for headaches.   Psychiatric/Behavioral:  Negative for depression.         OBJECTIVE:     Physical Exam:  Physical Exam  Vitals and nursing note reviewed. Exam conducted with a chaperone present.   Constitutional:       Appearance: She is well-developed.   Cardiovascular:      Rate and Rhythm: Normal rate.   Pulmonary:      Effort: Pulmonary effort is normal. No respiratory distress.   Chest:   Breasts:     Breasts are symmetrical.   Abdominal:      General: There is no distension.      Palpations: Abdomen is soft.      Tenderness: There is no abdominal tenderness.   Genitourinary:     Vagina: Vaginal discharge present.      Comments: Ectropion on cervix  Skin:     General: Skin is warm and dry.   Neurological:      Mental Status: She is alert and oriented to person, place, and time.       ASSESSMENT:       ICD-10-CM ICD-9-CM    1. Well woman exam with routine gynecological exam  Z01.419 V72.31 Liquid-Based Pap Smear, Screening      HPV High Risk Genotypes, PCR      2. Vaginal discharge  N89.8 623.5 Vaginosis Screen  by DNA Probe      3. Urge incontinence of urine  N39.41 788.31 Ambulatory referral/consult to Urology      4. Postcoital bleeding  N93.0 626.7       5. Boil of inguinal region  L02.224 680.2 benzocaine (BOIL RELIEF) 20 % Oint          Plan:      Ramiro WAYNE was seen today for annual exam.    Diagnoses and all orders for this visit:    Well woman exam with routine gynecological exam  -     Liquid-Based Pap Smear, Screening  -     HPV High Risk Genotypes, PCR    Vaginal discharge  -     Vaginosis Screen by DNA Probe  - Discussed with patient how douching/body wash/scented soaps/bubble baths can shift her vaginal celestino and natural vaginal pH which can cause overgrowth of yeast or gardnerella which is the bacteria that causes BV. Discussed with patient to use only mild, unscented soaps such as Dove unscented and Dial and water to wash her vagina.     Urge incontinence of urine  -     Ambulatory referral/consult to Urology; Future    Postcoital bleeding  - Will await culture and pap  - May treat for cervicitis     Boil of inguinal region  -     benzocaine (BOIL RELIEF) 20 % Oint; Apply 1 g topically 2 (two) times a day.        Orders Placed This Encounter   Procedures    HPV High Risk Genotypes, PCR    Vaginosis Screen by DNA Probe    Ambulatory referral/consult to Urology       Follow up in about 1 year (around 2/28/2025) for Well Woman/Annual.    Counseling time: 30 minutes    Leti Becerra

## 2024-03-01 LAB
BACTERIAL VAGINOSIS DNA: NEGATIVE
CANDIDA GLABRATA DNA: NEGATIVE
CANDIDA KRUSEI DNA: NEGATIVE
CANDIDA RRNA VAG QL PROBE: NEGATIVE
T VAGINALIS RRNA GENITAL QL PROBE: NEGATIVE

## 2024-03-05 LAB
FINAL PATHOLOGIC DIAGNOSIS: NORMAL
Lab: NORMAL

## 2024-03-07 LAB
HPV HR 12 DNA SPEC QL NAA+PROBE: NEGATIVE
HPV16 AG SPEC QL: NEGATIVE
HPV18 DNA SPEC QL NAA+PROBE: NEGATIVE

## 2024-03-12 ENCOUNTER — PATIENT MESSAGE (OUTPATIENT)
Dept: OBSTETRICS AND GYNECOLOGY | Facility: CLINIC | Age: 39
End: 2024-03-12
Payer: MEDICAID

## 2024-03-14 ENCOUNTER — OFFICE VISIT (OUTPATIENT)
Dept: UROLOGY | Facility: CLINIC | Age: 39
End: 2024-03-14
Payer: MEDICAID

## 2024-03-14 VITALS — BODY MASS INDEX: 27.42 KG/M2 | WEIGHT: 159.75 LBS

## 2024-03-14 DIAGNOSIS — N32.81 OAB (OVERACTIVE BLADDER): Primary | ICD-10-CM

## 2024-03-14 PROBLEM — D62 ACUTE BLOOD LOSS ANEMIA: Status: RESOLVED | Noted: 2019-07-23 | Resolved: 2024-03-14

## 2024-03-14 PROCEDURE — 99213 OFFICE O/P EST LOW 20 MIN: CPT | Mod: PBBFAC | Performed by: STUDENT IN AN ORGANIZED HEALTH CARE EDUCATION/TRAINING PROGRAM

## 2024-03-14 PROCEDURE — 3008F BODY MASS INDEX DOCD: CPT | Mod: CPTII,,, | Performed by: STUDENT IN AN ORGANIZED HEALTH CARE EDUCATION/TRAINING PROGRAM

## 2024-03-14 PROCEDURE — 99999 PR PBB SHADOW E&M-EST. PATIENT-LVL III: CPT | Mod: PBBFAC,,, | Performed by: STUDENT IN AN ORGANIZED HEALTH CARE EDUCATION/TRAINING PROGRAM

## 2024-03-14 PROCEDURE — 1159F MED LIST DOCD IN RCRD: CPT | Mod: CPTII,,, | Performed by: STUDENT IN AN ORGANIZED HEALTH CARE EDUCATION/TRAINING PROGRAM

## 2024-03-14 PROCEDURE — 1160F RVW MEDS BY RX/DR IN RCRD: CPT | Mod: CPTII,,, | Performed by: STUDENT IN AN ORGANIZED HEALTH CARE EDUCATION/TRAINING PROGRAM

## 2024-03-14 PROCEDURE — 99203 OFFICE O/P NEW LOW 30 MIN: CPT | Mod: S$PBB,,, | Performed by: STUDENT IN AN ORGANIZED HEALTH CARE EDUCATION/TRAINING PROGRAM

## 2024-03-14 NOTE — PATIENT INSTRUCTIONS
What is Pelvic Floor Therapy?    Pelvic Floor Therapy refers to a number of therapeutic assessment and treatment techniques intended to decrease discomfort and increase your control of your pelvic floor muscles. Pelvic floor therapy can be used in the prevention of pelvic floor issues (such as urinary incontinence), pelvic pain or the rehabilitation of existing issues and post-surgical recovery.     What is the Pelvic Floor?    The Pelvic Floor consists of the muscles, nerves, connective tissues, tendons, and ligaments that lay like a hammock in your pelvis. This hammock supports you vagina, bladder, rectum, and anus. It is important to keep these muscles strong, flexible, controlled, responsive, and alive, especially because these muscles are crucial for sexual function and comfortable urination.    What Is Pelvic Floor Dysfunction?    Pelvic Floor Dysfunction refers to the inability to control the muscles of the pelvic floor.  To simplify things, there are two main issues that cause most of the issues with the pelvic floor: weakness or tightness of the pelvic floor muscles.    What are the symptoms of Pelvic Floor Dysfunction?    The symptoms include but may not be limited to:        Pain or spasming in the anus, deeper into the pelvis, vagina, and/or bladder      Difficulty urinating, including urge and stress incontinence, mixed incontinence, hesitancy, and frequency of urination      Anxiety about sex before, during, or after the act, causing a dissatisfactory experience      Fecal constipation or incontinence      Pelvic pain      Difficulty with sexual orgasms    What are the causes of male pelvic floor dysfunction?        Weak (or loose) pelvic floor muscles can be caused by:      Constipation      Being overweight      Heavy lifting      Chronic coughing      Deconditioned muscles causing over-compensation and imbalance       Tight muscles are caused by a number of factors:      Stress      Anxiety       Thomson behavior or habits over time      Inability to relax      Misunderstandings and overcorrection of core stability training        Is Pelvic Floor Rehabilitation right for me?    Pelvic Floor Therapy is designed to give you control of your pelvic muscles and thereby give you control over your life by addressing and discomfort related to your pelvic floor.     What are the procedures used for pelvic floor evaluation?  Evaluating a pelvic floor involves looking at the patient as a whole.         Assessment of posture      Muscle strength and length testing      Internal and external examination to palpate muscles for tenderness and restrictions ( to the patient's comfort)      Surface electrical testing to assess the electrical activity in your pelvic region      Ultrasound Imaging of the pelvic floor position and excursion during contraction, relaxation, and balance    What Can I Expect During Pelvic Floor Therapy?    There a number of techniques designed to help with pelvic floor disorders. The main aim of treatment, no matter what the issues are, is to relax the muscles to an acceptable baseline level. After achieving an appropriate baseline of muscle activity and resting tone, the goal is to create control through the range of muscle activation. So, to put it another way, pelvic floor muscle action controlled by you, from relaxation to contraction and back again.    Treatment Techniques can include:        Education and home program (including Kegel exercises)      Posture feedback and education       Manual therapy to tighten external and internal pelvic muscles      External muscle strengthening and lengthening as needed      EMG Biofeedback      Ultrasound imaging Biofeedback      Manometric Biofeedback    How long does a course of pelvic floor rehabilitation usually take to work?    It takes between 6 to 8 weeks to see a physiological change in a muscle or skin after therapy has started. However, is it  not uncommon to see changes in coordination and muscle control well before this time due to a reinforcement of the existing neural pathways.      Please call [phone] to schedule your appointment with one of our Pelvic Floor Physical Therapists. A popular name amongst our patients is Judith Castillo.

## 2024-03-14 NOTE — PROGRESS NOTES
Patient ID: Ramiro Rubio is a 38 y.o. female.    Chief Complaint: No chief complaint on file.    Referral: Leti Becerra MD  [address]  [address]  [place] [address]     [facility]  38 y.o. who presents to the Urology clinic for evaluation of urinary urgency and frequency since delivering her child. Patient notes she has not had any social accidents. Notes strong urge to void with pressure to void. She denies use of urinary pads. Wears 1-2 liner per day. Symptoms are not every day. Notes fecal urgency as well. She drinks primarily water with occasional tea, mushroom coffee. Patient w/o recurrent UTIs. Patient without hematuria, dysuria.     Medically Necessary ROS documented in HPI    Past Medical History  Active Ambulatory Problems     Diagnosis Date Noted    Acute blood loss anemia 07/23/2019     Resolved Ambulatory Problems     Diagnosis Date Noted    Full-term premature rupture of membranes with onset of labor within 24 hours of rupture 07/21/2019    39 weeks gestation of pregnancy 07/21/2019    Vaginal delivery 07/22/2019    Full-term premature rupture of membranes with onset of labor within 24 hours of rupture 07/22/2019     Past Medical History:   Diagnosis Date    Abnormal Pap smear of cervix 2008         Past Surgical History  Past Surgical History:   Procedure Laterality Date    CRYOABLATION      LYMPH NODE BIOPSY         Social History  Social Connections: Unknown (2/25/2024)    Social Connection and Isolation Panel [NHANES]     Frequency of Communication with Friends and Family: More than three times a week     Frequency of Social Gatherings with Friends and Family: More than three times a week     Attends Oriental orthodox Services: Not on file     Active Member of Clubs or Organizations: No     Attends Club or Organization Meetings: Never     Marital Status: Patient declined       Medications    Current Outpatient Medications:     benzocaine (BOIL RELIEF) 20 % Oint, Apply 1 g topically 2 (two) times  a day., Disp: 28 g, Rfl: 0    docusate sodium (COLACE) 100 MG capsule, Take 2 capsules (200 mg total) by mouth 2 (two) times daily as needed for Constipation., Disp: , Rfl: 0    ibuprofen (ADVIL,MOTRIN) 600 MG tablet, Take 1 tablet (600 mg total) by mouth every 6 (six) hours as needed (cramping)., Disp: 60 tablet, Rfl: 2    PRENATAL VITAMIN PLUS LOW IRON 27 mg iron- 1 mg Tab, , Disp: , Rfl: 0    Allergies  Review of patient's allergies indicates:  No Known Allergies    Patient's PMH, FH, Social hx, Medications, allergies reviewed and updated as pertinent to today's visit    Objective:      Physical Exam  Constitutional:       Appearance: She is well-developed.   HENT:      Head: Normocephalic and atraumatic.   Eyes:      Conjunctiva/sclera: Conjunctivae normal.   Pulmonary:      Effort: Pulmonary effort is normal. No respiratory distress.   Abdominal:      General: Abdomen is flat. There is no distension.      Palpations: Abdomen is soft. There is no mass.      Tenderness: There is no abdominal tenderness. There is no right CVA tenderness, left CVA tenderness or guarding.   Skin:     General: Skin is warm.      Findings: No rash.   Neurological:      Mental Status: She is alert and oriented to person, place, and time.   Psychiatric:         Behavior: Behavior normal.             Assessment:       1. Urge incontinence of urine        Plan:       POCT UA w/o evidence of infection.     What is known about OAB was discussed with the patient including the benefits versus risks/burdens of the available treatment alternatives and the fact that acceptable symptom control may require trials of multiple therapeutic options before it is achieved. Discussed with patient OAB is a symptom complex that is not a life-threatening condition, acknowledged it can impair quality of life for many patients. OAB treatment plan reviewed with patient    Discussed and recommended first Line therapy:   -Behavioral modification- pelvic floor  retraining exercises ( which can be successful in up to 75% of patients) , limit known bladder irritants such as caffeine, soda, alcohol, use of a voiding diary to determine frequency of events.   -recommend completion of voiding diary, 3 days to better characterize patient's symptoms and optimize treatment plan efficacy  -Treatment of any underlying constipation with increased fiber, water intake.  - Timed voiding every 4-6 hours, if patient unable to hold urine every 4 hours, engaging in bladder retraining to reach this time frame in 0.5-1 hr interval, with anticipated increase in bladder storage habits.  - Drinking at least 8-10 glasses of water daily in addition to other beverages.  Limit intake of fluids to 6pm.  -Discussed and recommended pelvic floor muscle therapy to strengthen weak pelvic floor muscles.    Acute problem, first time management    Physical therapy referral placed    RTC 3 months for symptom check

## 2024-03-15 DIAGNOSIS — N93.0 POSTCOITAL BLEEDING: Primary | ICD-10-CM

## 2024-03-18 ENCOUNTER — HOSPITAL ENCOUNTER (OUTPATIENT)
Dept: RADIOLOGY | Facility: HOSPITAL | Age: 39
Discharge: HOME | End: 2024-03-18
Attending: OBSTETRICS & GYNECOLOGY
Payer: MEDICAID

## 2024-03-18 DIAGNOSIS — N93.0 POSTCOITAL BLEEDING: ICD-10-CM

## 2024-03-18 PROCEDURE — 76830 TRANSVAGINAL US NON-OB: CPT | Mod: 26,,, | Performed by: RADIOLOGY

## 2024-03-18 PROCEDURE — 76830 TRANSVAGINAL US NON-OB: CPT | Mod: TC

## 2024-03-18 PROCEDURE — 76856 US EXAM PELVIC COMPLETE: CPT | Mod: 26,,, | Performed by: RADIOLOGY

## 2024-04-08 ENCOUNTER — HOSPITAL ENCOUNTER (EMERGENCY)
Facility: HOSPITAL | Age: 39
Discharge: HOME | End: 2024-04-08
Attending: EMERGENCY MEDICINE
Payer: MEDICAID

## 2024-04-08 VITALS
OXYGEN SATURATION: 98 % | SYSTOLIC BLOOD PRESSURE: 127 MMHG | WEIGHT: 158 LBS | RESPIRATION RATE: 18 BRPM | HEART RATE: 69 BPM | HEIGHT: 60 IN | DIASTOLIC BLOOD PRESSURE: 81 MMHG | TEMPERATURE: 98 F | BODY MASS INDEX: 31.02 KG/M2

## 2024-04-08 DIAGNOSIS — R19.7 DIARRHEA, UNSPECIFIED TYPE: ICD-10-CM

## 2024-04-08 DIAGNOSIS — R11.2 NAUSEA AND VOMITING, UNSPECIFIED VOMITING TYPE: Primary | ICD-10-CM

## 2024-04-08 LAB
ALBUMIN SERPL-MCNC: 4.5 G/DL (ref 3.3–5.5)
ALP SERPL-CCNC: 55 U/L (ref 42–141)
B-HCG UR QL: NEGATIVE
BILIRUB SERPL-MCNC: 1.6 MG/DL (ref 0.2–1.6)
BILIRUBIN, POC UA: NEGATIVE
BLOOD, POC UA: NEGATIVE
BUN SERPL-MCNC: 6 MG/DL (ref 7–22)
CALCIUM SERPL-MCNC: 9.9 MG/DL (ref 8–10.3)
CHLORIDE SERPL-SCNC: 105 MMOL/L (ref 98–108)
CLARITY, POC UA: CLEAR
COLOR, POC UA: YELLOW
CREAT SERPL-MCNC: 0.7 MG/DL (ref 0.6–1.2)
CTP QC/QA: YES
GLUCOSE SERPL-MCNC: 99 MG/DL (ref 73–118)
GLUCOSE, POC UA: NEGATIVE
HCT, POC: NORMAL
HGB, POC: NORMAL (ref 14–18)
KETONES, POC UA: NEGATIVE
LEUKOCYTE EST, POC UA: NEGATIVE
MCH, POC: NORMAL
MCHC, POC: NORMAL
MCV, POC: NORMAL
MPV, POC: NORMAL
NITRITE, POC UA: NEGATIVE
PH UR STRIP: 6 [PH]
POC ALT (SGPT): 31 U/L (ref 10–47)
POC AST (SGOT): 43 U/L (ref 11–38)
POC PLATELET COUNT: NORMAL
POC TCO2: 28 MMOL/L (ref 18–33)
POTASSIUM BLD-SCNC: 4 MMOL/L (ref 3.6–5.1)
PROTEIN, POC UA: ABNORMAL
PROTEIN, POC: 8.5 G/DL (ref 6.4–8.1)
RBC, POC: NORMAL
RDW, POC: NORMAL
SODIUM BLD-SCNC: 139 MMOL/L (ref 128–145)
SPECIFIC GRAVITY, POC UA: >=1.03
UROBILINOGEN, POC UA: 0.2 E.U./DL
WBC, POC: NORMAL

## 2024-04-08 PROCEDURE — 63600175 PHARM REV CODE 636 W HCPCS: Mod: ER | Performed by: NURSE PRACTITIONER

## 2024-04-08 PROCEDURE — 81025 URINE PREGNANCY TEST: CPT | Mod: ER | Performed by: EMERGENCY MEDICINE

## 2024-04-08 PROCEDURE — 96365 THER/PROPH/DIAG IV INF INIT: CPT | Mod: ER

## 2024-04-08 PROCEDURE — 85025 COMPLETE CBC W/AUTO DIFF WBC: CPT | Mod: ER

## 2024-04-08 PROCEDURE — 99284 EMERGENCY DEPT VISIT MOD MDM: CPT | Mod: 25,ER

## 2024-04-08 PROCEDURE — 80053 COMPREHEN METABOLIC PANEL: CPT | Mod: ER

## 2024-04-08 PROCEDURE — 81025 URINE PREGNANCY TEST: CPT | Mod: ER

## 2024-04-08 PROCEDURE — 96361 HYDRATE IV INFUSION ADD-ON: CPT | Mod: ER

## 2024-04-08 PROCEDURE — 25000003 PHARM REV CODE 250: Mod: ER | Performed by: NURSE PRACTITIONER

## 2024-04-08 RX ORDER — PROMETHAZINE HYDROCHLORIDE 25 MG/1
25 SUPPOSITORY RECTAL EVERY 6 HOURS PRN
Qty: 10 SUPPOSITORY | Refills: 0 | Status: SHIPPED | OUTPATIENT
Start: 2024-04-08

## 2024-04-08 RX ADMIN — SODIUM CHLORIDE 1000 ML: 9 INJECTION, SOLUTION INTRAVENOUS at 03:04

## 2024-04-08 RX ADMIN — PROMETHAZINE HYDROCHLORIDE 12.5 MG: 25 INJECTION INTRAMUSCULAR; INTRAVENOUS at 03:04

## 2024-04-08 NOTE — ED PROVIDER NOTES
Encounter Date: 4/8/2024       History     Chief Complaint   Patient presents with    Abdominal Pain     Patient presents w/ a c/o of intermittent abdominal pain associated w/ nausea and vomiting a week ago. Concerned she may have a stomach virus.      Complaint: Abdominal pain    History of present illness: Patient is a 38-year-old female who reports that 4 days ago she began experience decreased appetite nausea and vomiting without bloody or bilious products and lightheadedness.  She denies abdominal pain.  Reports the vomiting is intermittent and it is worse with standing.  She states when she stands she has lightheadedness.    The history is provided by the patient. No  was used.     Review of patient's allergies indicates:  No Known Allergies  Past Medical History:   Diagnosis Date    Abnormal Pap smear of cervix 2008    Acute blood loss anemia 07/23/2019     Past Surgical History:   Procedure Laterality Date    CRYOABLATION      LYMPH NODE BIOPSY       Family History   Problem Relation Age of Onset    Breast cancer Neg Hx     Colon cancer Neg Hx     Ovarian cancer Neg Hx      Social History     Tobacco Use    Smoking status: Never    Smokeless tobacco: Never   Substance Use Topics    Alcohol use: Not Currently    Drug use: No     Review of Systems   Constitutional:  Positive for appetite change. Negative for chills, fatigue and fever.   HENT:  Negative for congestion, ear discharge, ear pain, postnasal drip, rhinorrhea, sinus pressure, sneezing, sore throat and voice change.    Eyes:  Negative for discharge and itching.   Respiratory:  Negative for cough, shortness of breath and wheezing.    Cardiovascular:  Negative for chest pain, palpitations and leg swelling.   Gastrointestinal:  Positive for nausea and vomiting. Negative for abdominal pain, constipation and diarrhea.   Endocrine: Negative for polydipsia, polyphagia and polyuria.   Genitourinary:  Negative for dysuria, frequency,  hematuria, urgency, vaginal bleeding, vaginal discharge and vaginal pain.   Musculoskeletal:  Negative for arthralgias and myalgias.   Skin:  Negative for rash and wound.   Neurological:  Positive for light-headedness. Negative for dizziness, seizures, syncope, weakness and numbness.   Hematological:  Negative for adenopathy. Does not bruise/bleed easily.   Psychiatric/Behavioral:  Negative for self-injury and suicidal ideas. The patient is not nervous/anxious.        Physical Exam     Initial Vitals [04/08/24 1341]   BP Pulse Resp Temp SpO2   129/78 65 20 97.7 °F (36.5 °C) 100 %      MAP       --         Physical Exam    Nursing note and vitals reviewed.  Constitutional: She appears well-developed and well-nourished.   HENT:   Head: Normocephalic and atraumatic.   Right Ear: External ear normal.   Left Ear: External ear normal.   Nose: Nose normal.   Eyes: Conjunctivae and EOM are normal. Pupils are equal, round, and reactive to light. Right eye exhibits no discharge. Left eye exhibits no discharge.   Neck:   Normal range of motion.  Abdominal: Abdomen is soft. Bowel sounds are normal. She exhibits no distension. There is no abdominal tenderness.   Musculoskeletal:         General: Normal range of motion.      Cervical back: Normal range of motion.     Neurological: She is alert and oriented to person, place, and time.   Skin: Skin is dry. Capillary refill takes less than 2 seconds.         ED Course   Procedures  Labs Reviewed   POCT URINALYSIS W/O SCOPE - Abnormal; Notable for the following components:       Result Value    Spec Grav UA >=1.030 (*)     Protein, UA 1+ (*)     All other components within normal limits   POCT CMP - Abnormal; Notable for the following components:    AST (SGOT), POC 43 (*)     POC BUN 6 (*)     Protein, POC 8.5 (*)     All other components within normal limits   POCT URINE PREGNANCY   POCT CBC   POCT CMP          Imaging Results    None          Medications   sodium chloride 0.9%  bolus 1,000 mL 1,000 mL (0 mLs Intravenous Stopped 4/8/24 1627)   promethazine (PHENERGAN) 12.5 mg in dextrose 5 % (D5W) 50 mL IVPB (0 mg Intravenous Stopped 4/8/24 1548)     Medical Decision Making  Patient is a 38-year-old female who reports that 4 days ago she began experience decreased appetite nausea and vomiting without bloody or bilious products and lightheadedness.  She denies abdominal pain.  Reports the vomiting is intermittent and it is worse with standing.  She states when she stands she has lightheadedness.    On physical exam the patient is afebrile nontoxic in no apparent distress abdomen is soft and nontender x4 quadrants.      Differential diagnosis includes gastroenteritis gastritis pancreatitis cannabinoid hyperemesis    Problems Addressed:  Diarrhea, unspecified type: acute illness or injury     Details: Push fluids, high-fiber diet.  Nausea and vomiting, unspecified vomiting type: acute illness or injury     Details: Phenergan suppositories, Phenergan was effective for relieving the patient's nausea and vomiting in the ED.    Amount and/or Complexity of Data Reviewed  Labs: ordered. Decision-making details documented in ED Course.  Discussion of management or test interpretation with external provider(s): Vital signs at the time of disposition were:  /81   Pulse 69   Temp 97.8 °F (36.6 °C)   Resp 18   Ht 5' (1.524 m)   Wt 71.7 kg (158 lb)   SpO2 98%   BMI 30.86 kg/m²       See AVS for additional recommendations. Medications listed herein were prescribed after reviewing the patient's allergies, medication list, history, most recent laboratories as available.  Referrals below were provided after reviewing the patient's previous medical providers. She understands she  should return for any worsening or changes in condition.  Prior to discharge the patient was asked if she  had any additional concerns or complaints and she declined. The patient was given an opportunity to ask questions  and all were answered to her satisfaction.     Risk  Prescription drug management.  Diagnosis or treatment significantly limited by social determinants of health.               ED Course as of 04/09/24 0000   Mon Apr 08, 2024   1453 POCT URINALYSIS W/O SCOPE(!)  Neg for uti. [VC]   1453 BP: 129/78 [VC]   1453 Temp: 97.7 °F (36.5 °C) [VC]   1453 Temp Source: Oral [VC]   1453 Pulse: 65 [VC]   1453 Resp: 20 [VC]   1453 SpO2: 100 % [VC]   1531 hCG Qualitative, Urine: Negative [VC]   1551 POCT CMP(!)  Normal cmp. [VC]   1551 POCT CBC  Normal cbc. [VC]   1735 BP: 127/81 [VC]   1735 Temp: 97.8 °F (36.6 °C) [VC]   1735 Pulse: 69 [VC]   1735 Resp: 18 [VC]   1735 SpO2: 98 % [VC]      ED Course User Index  [VC] Dereck Castaneda DNP                           Clinical Impression:  Final diagnoses:  [R11.2] Nausea and vomiting, unspecified vomiting type (Primary)  [R19.7] Diarrhea, unspecified type          ED Disposition Condition    Discharge Stable          ED Prescriptions       Medication Sig Dispense Start Date End Date Auth. Provider    promethazine (PHENERGAN) 25 MG suppository Place 1 suppository (25 mg total) rectally every 6 (six) hours as needed for Nausea. 10 suppository 4/8/2024 -- Dereck Castaneda DNP          Follow-up Information       Follow up With Specialties Details Why Contact [facility] -  Schedule an appointment as soon as possible for a visit   [address]  [place] [address]  [phone]               Dereck Castaneda DNP  04/09/24 0000

## 2024-04-09 ENCOUNTER — PATIENT MESSAGE (OUTPATIENT)
Dept: REHABILITATION | Facility: OTHER | Age: 39
End: 2024-04-09

## 2024-04-15 ENCOUNTER — CLINICAL SUPPORT (OUTPATIENT)
Dept: REHABILITATION | Facility: OTHER | Age: 39
End: 2024-04-15
Attending: STUDENT IN AN ORGANIZED HEALTH CARE EDUCATION/TRAINING PROGRAM
Payer: MEDICAID

## 2024-04-15 DIAGNOSIS — R27.8 COORDINATION IMPAIRMENT: ICD-10-CM

## 2024-04-15 DIAGNOSIS — M62.89 PELVIC FLOOR DYSFUNCTION: Primary | ICD-10-CM

## 2024-04-15 PROCEDURE — 97110 THERAPEUTIC EXERCISES: CPT | Mod: PN | Performed by: PHYSICAL THERAPIST

## 2024-04-15 PROCEDURE — 97161 PT EVAL LOW COMPLEX 20 MIN: CPT | Mod: PN | Performed by: PHYSICAL THERAPIST

## 2024-04-15 NOTE — PLAN OF CARE
[facility]   Pelvic Health Physical Therapy Initial Evaluation      Date: 4/15/2024   Name: Ramiro Rubio  Clinic Number: [id number]    Therapy Diagnosis:   Encounter Diagnoses   Name Primary?    Pelvic floor dysfunction Yes    Coordination impairment      Referring Provider: Sabas Hutchinson MD    Referring Provider Orders: PT Eval and Treat  Medical Diagnosis from Referral: OAB (overactive bladder) [N32.81]   Evaluation Date: 4/15/2024  Authorization Period Expiration: 12/31/2024   Plan of Care Expiration: 7/15/2024  Visit # / Visits authorized: 1/pending  FOTO: 1 (4/15/2024)    Precautions: standard    Time In: 12:30  Time Out: 13:15  Total Appointment Time (timed & untimed codes): 45 minutes    SUBJECTIVE     Date of onset: 2019, worse in last few months  History of current condition: Ramiro reports urinary and fecal urgency/urge incontinence that started after the birth of her child. She is getting over gastrointestinal infection/illness last week and reports significant dizziness today.    OB/GYN HISTORY - 1 vaginal delivery (2019)    BLADDER HISTORY  Frequency of urination:   Day: every 3-4 hours           Night: 1x  Difficulty initiating urine stream: No  Hover of toilet seats: Yes  Urinary Urgency: Yes  Bladder leakage: Yes - with urge  Frequency of incidents: couple of times per week  Form of protection: panty liner    BOWEL HISTORY  Frequency of bowel movements: at least once a day  Difficulty initiating BM: No  Quality/shape of BM: [place] Stool Chart 4-7  Incomplete emptying? Yes  Fiber supplements, probiotics or laxative use? No  Colon leakage: Yes  Frequency of incidents: couple of times per week     SEXUAL/PELVIC PAIN HISTORY  Sexually active? No   Pain with vaginal exams, intercourse or tampon use? No  Pain with wearing certain clothes, sitting on certain surfaces? No  Feeling of pelvic heaviness? No    Pain: none reported    Imaging: none pertinent to the pelvic  floor    Prior Therapy: no pelvic floor therapy  Social History: lives in a house with steps to enter  Current exercise: ab exercises and weight-lifting at gym 4-5x/week  Prior Level of Function: no pelvic floor dysfunction  Current Level of Function: frequent urinary and fecal urgency/urge incontinence    Types of fluid intake: less than 3 glasses of water per day, mushroom coffee, almond milk, tea  Diet: no meat, some vegetables, vegan nuggets, noodles  Habitus: well developed, well nourished  Abuse/Neglect: none reported    Patients goals: improve continence     Medical History: Ramiro has a past medical history of Abnormal Pap smear of cervix (2008) and Acute blood loss anemia (07/23/2019).     Surgical History: Ramiro Rubio has a past surgical history that includes Cryoablation and Lymph node biopsy.    Medications: Ramiro WAYNE has a current medication list which includes the following prescription(s): boil relief and promethazine.    Allergies: Review of patient's allergies indicates:  No Known Allergies     OBJECTIVE     Vestibular assessment performed by Luis Darden, PT, DPT, OCS  POSITIONAL CANAL TESTING  Looking for nystagmus (slow phase followed by quick phase to the affected side for BPPV)     Positional Vertigo/Nystagmus:              Alexander-Hallpike (Anterior/Posterior) :RIGHT(-) Onset (-) Duration (-)                                                      : LEFT(+) Onset immediately Duration 30 seconds              Roll Test(Horizontal):RIGHT- Onset- Duration-                                                  LEFT- Onset- Duration-     Visual/Auditory: denies changes  Tracking/Smooth Pursuits:Intact  Saccades: Intact  Acuity:Intact  (R)/(L) discrimination: Intact  Visual field: Intact  Convergence: (-)  Vestibulo-ocular reflex (VOR): Intact  Vestibulocollic reflex (VCR): Impaired (head remains still, body moves)  Head thrust: (+) Left     Central Vestibular Tests:(+ or -)  Spontaneous Nystagmus: (-)  Smooth  Pursuit: (+) horizontal nystagmus Left beat   Gaze Evoked Nystagmus: (-)  Saccades:(-)    Limitation/Restriction for FOTO Pelvic Floor Surveys    Therapist reviewed FOTO scores for Ramiro Rubio on 4/15/2024  FOTO documents entered into [facility] - see Media section.    Limitation Score:   PFDI Bowel - 29% impairment  Bowel Leakage - 45% impairment     TREATMENT     Total Treatment time (time-based codes) separate from the evaluation: 10 minutes   All interventions billed as Therapeutic Exercise, per Medicaid guidelines    Therapeutic exercises to develop strength, endurance and core stabilization for 10 minutes -  [x] Instruction on urge suppression techniques  [x] Education on visual cues/mental imagery for pelvic floor relaxation  [x] Education on voiding optimization - seated on toilet, no pushing  [x] HEP building/HEP review    PATIENT EDUCATION AND HOME EXERCISES     Education provided: general anatomy/physiology of urinary & bowel system, benefits of treatment, and alternative methods of treatment were discussed with the patient. Additionally, Ramiro WAYNE was provided education on pt prognosis, PT plan of care, pelvic floor anatomy & function, relationship between TrA & PFM, relationship between hips & PFM, urge suppression, and etiology of urinary urgency    Written Home Exercises provided: yes  Exercises were reviewed, and Ramiro was able to demonstrate them prior to the end of the session. Ramiro demonstrated good understanding of the education provided. See EMR under 'Patient Instructions' for exercises and education provided during session.    ASSESSMENT     Ramiro is a 38 y.o. female referred to outpatient physical therapy with a medical diagnosis of OAB (overactive bladder). Symptoms impair the patient's ability to perform ADLs and functional mobility, as well as remain continent.  No direct pelvic floor examination performed today, but pt presentation and subjective report suggest pelvic floor dysfunction - weakness  vs. tension contributing to urgency and incontinence; Pelvic floor assessment needed.    Patient prognosis is good  Ramiro will benefit from skilled outpatient physical therapy to address the deficits stated above and in the chart below, provide patient/family education, and to maximize the patient's level of independence.     Plan of care discussed with patient: yes  Patient's spiritual, cultural and educational needs considered, and the patient is agreeable to the plan of care and goals as stated below:     Anticipated Barriers for therapy: none    Medical Necessity is demonstrated by the following -  History  Co-morbidities and personal factors that may impact the plan of care Co-morbidities   Abnormal Pap smear of cervix (2008) and Acute blood loss anemia (07/23/2019)  History of pregnancy and vaginal delivery    Personal Factors  no deficits     moderate   Examination  Body structures and functions, activity limitations and participation restrictions that may impact the plan of care Body Regions/Systems/Functions:  poor coordination of pelvic floor muscles during ADL's leading to urinary or fecal leakage     Activity Limitations:  incontinence with ADLs    Participation Restrictions:  all ADLs/iADLs uninterrupted by urinary incontinence/urgency/frequency, all ADLs/iADLs uninterrupted by fecal incontinence/urgency/frequency, and social activities with friends/family    Activity limitations:   Learning and applying knowledge  no deficits    General Tasks and Commands  no deficits    Communication  no deficits    Mobility  no deficits    Self care  toileting    Domestic Life  no deficits    Interactions/Relationships  no deficits    Life Areas  no deficits    Community and Social Life  Community life, recreation & leisure       low   Clinical Presentation stable and uncomplicated low   Decision Making/ Complexity Score: low         Short Term Goals: 6 weeks   Pt to report >50% improvement in urine leakage  Pt to  report >50% improvement in fecal leakage  Pt to verbalize urge suppression strategies for improved control over urgency and urge urinary incontinence   Pt to be independent with introductory home exercise program     Long Term Goals: 12 weeks   Pt to deny urinary incontinence to demonstrate improved pelvic floor coordination   Pt to deny fecal incontinence to demonstrate improved pelvic floor coordination   Pt to score no more than 25% impairment on the Bowel Leakage FOTO survey to demonstrate reduced impairment due to pelvic floor dysfunction   Pt to be independent with advanced home exercise program      PLAN     Plan of Care certification: 4/15/2024 to 7/15/2024    Outpatient Physical Therapy - 1 time per week for 12 weeks to include the following interventions: Therapeutic Exercise, Manual Therapy, Therapeutic Activity, Neuromuscular Re-education, Electrical Stimulation Unattended, Self-Care, Patient Education    *Add eval & treat dizziness for suspected BPPV vs. vestibular neuritis      Pura Vernon, PT, DPT

## 2024-04-18 ENCOUNTER — OFFICE VISIT (OUTPATIENT)
Dept: FAMILY MEDICINE | Facility: CLINIC | Age: 39
End: 2024-04-18
Payer: MEDICAID

## 2024-04-18 VITALS
HEIGHT: 60 IN | SYSTOLIC BLOOD PRESSURE: 100 MMHG | HEART RATE: 73 BPM | BODY MASS INDEX: 31.94 KG/M2 | TEMPERATURE: 99 F | DIASTOLIC BLOOD PRESSURE: 70 MMHG | OXYGEN SATURATION: 99 % | WEIGHT: 162.69 LBS

## 2024-04-18 DIAGNOSIS — H81.11 BENIGN PAROXYSMAL POSITIONAL VERTIGO OF RIGHT EAR: Primary | ICD-10-CM

## 2024-04-18 PROCEDURE — 3074F SYST BP LT 130 MM HG: CPT | Mod: CPTII,,, | Performed by: INTERNAL MEDICINE

## 2024-04-18 PROCEDURE — 3008F BODY MASS INDEX DOCD: CPT | Mod: CPTII,,, | Performed by: INTERNAL MEDICINE

## 2024-04-18 PROCEDURE — 1159F MED LIST DOCD IN RCRD: CPT | Mod: CPTII,,, | Performed by: INTERNAL MEDICINE

## 2024-04-18 PROCEDURE — 99214 OFFICE O/P EST MOD 30 MIN: CPT | Mod: S$PBB,,, | Performed by: INTERNAL MEDICINE

## 2024-04-18 PROCEDURE — 3078F DIAST BP <80 MM HG: CPT | Mod: CPTII,,, | Performed by: INTERNAL MEDICINE

## 2024-04-18 PROCEDURE — 1160F RVW MEDS BY RX/DR IN RCRD: CPT | Mod: CPTII,,, | Performed by: INTERNAL MEDICINE

## 2024-04-18 PROCEDURE — 99999 PR PBB SHADOW E&M-EST. PATIENT-LVL IV: CPT | Mod: PBBFAC,,, | Performed by: INTERNAL MEDICINE

## 2024-04-18 PROCEDURE — 99214 OFFICE O/P EST MOD 30 MIN: CPT | Mod: PBBFAC,PN | Performed by: INTERNAL MEDICINE

## 2024-04-18 RX ORDER — PREDNISONE 20 MG/1
TABLET ORAL
Qty: 25 TABLET | Refills: 0 | Status: SHIPPED | OUTPATIENT
Start: 2024-04-18

## 2024-04-18 NOTE — PROGRESS NOTES
Subjective:       Patient ID: Ramiro Rubio is a 38 y.o. female.    Chief Complaint: Dizziness (Headaches, nausea, vomiting  and Lt hand tremors x5 days )    Discuss vertigo    HPI: 37 y/o no significant past medical history notes two weeks of sensation of room spinning exacerbated by head movements lying on right side no tinnitus minimal improvement with meclizine. No nasal congestion no sore throat no facial parathesia     Dizziness:   Chronicity:  New  Onset:  1 to 4 weeks ago (x2 weeks)  Progression since onset:  Unchanged  Frequency:  Every few minutes  Severity:  Moderate  Dizziness characteristics:  Sensation of movement and off-balanceno ear pain, no fever, no headaches, no weakness, no palpitations and no chest pain.    Review of Systems   Constitutional:  Negative for activity change, appetite change, fatigue, fever and unexpected weight change.   HENT:  Negative for ear pain, rhinorrhea and sore throat.    Eyes:  Negative for discharge and visual disturbance.   Respiratory:  Negative for chest tightness, shortness of breath and wheezing.    Cardiovascular:  Negative for chest pain, palpitations and leg swelling.   Gastrointestinal:  Negative for abdominal pain, constipation and diarrhea.   Endocrine: Negative for cold intolerance and heat intolerance.   Genitourinary:  Negative for dysuria and hematuria.   Musculoskeletal:  Negative for joint swelling and neck stiffness.   Skin:  Negative for rash.   Neurological:  Positive for dizziness. Negative for syncope, weakness and headaches.   Psychiatric/Behavioral:  Negative for suicidal ideas.        Objective:     Vitals:    04/18/24 1006   BP: 100/70   BP Location: Right arm   Patient Position: Sitting   BP Method: Large (Manual)   Pulse: 73   Temp: 98.7 °F (37.1 °C)   TempSrc: Oral   SpO2: 99%   Weight: 73.8 kg (162 lb 11.2 oz)   Height: 5' (1.524 m)          Physical Exam  Constitutional:       Appearance: She is well-developed.   HENT:      Head:  Normocephalic and atraumatic.   Eyes:      General: No scleral icterus.     Extraocular Movements: Extraocular movements intact.      Conjunctiva/sclera: Conjunctivae normal.   Cardiovascular:      Rate and Rhythm: Normal rate and regular rhythm.      Heart sounds: No murmur heard.     No friction rub. No gallop.   Pulmonary:      Effort: Pulmonary effort is normal.      Breath sounds: Normal breath sounds. No wheezing or rales.   Abdominal:      General: Bowel sounds are normal.      Palpations: Abdomen is soft.      Tenderness: There is no abdominal tenderness. There is no guarding or rebound.   Musculoskeletal:         General: No tenderness. Normal range of motion.      Cervical back: Normal range of motion.   Skin:     General: Skin is warm and dry.   Neurological:      Mental Status: She is alert and oriented to person, place, and time.      Cranial Nerves: No cranial nerve deficit.      Comments: Left phelan horizontal nystagmus with right sided epley maneuver  No pronator drift  Normal rhomberg         Assessment and Plan   1. Benign paroxysmal positional vertigo of right ear  Versus vestibular neuritis. No evidence of neuro deficit on exam given length of symptoms trial short steroid course stop meclizine as likely desensitiving virtual follow up in one week if no improvement can consider advanced imaging  - predniSONE (DELTASONE) 20 MG tablet; Three tablets (60mg) po daily x five days, then two tabs (40mg) po daily x two days, then one tablet po daily x two days  Dispense: 25 tablet; Refill: 0

## 2024-05-01 ENCOUNTER — PATIENT MESSAGE (OUTPATIENT)
Dept: REHABILITATION | Facility: OTHER | Age: 39
End: 2024-05-01

## 2024-05-03 ENCOUNTER — PATIENT MESSAGE (OUTPATIENT)
Dept: FAMILY MEDICINE | Facility: CLINIC | Age: 39
End: 2024-05-03
Payer: MEDICAID

## 2024-05-03 DIAGNOSIS — H81.11 BENIGN PAROXYSMAL POSITIONAL VERTIGO OF RIGHT EAR: Primary | ICD-10-CM

## 2024-05-08 ENCOUNTER — LAB VISIT (OUTPATIENT)
Dept: LAB | Facility: HOSPITAL | Age: 39
End: 2024-05-08
Attending: FAMILY MEDICINE
Payer: MEDICAID

## 2024-05-08 ENCOUNTER — OFFICE VISIT (OUTPATIENT)
Dept: FAMILY MEDICINE | Facility: CLINIC | Age: 39
End: 2024-05-08
Payer: MEDICAID

## 2024-05-08 VITALS
HEART RATE: 89 BPM | OXYGEN SATURATION: 98 % | SYSTOLIC BLOOD PRESSURE: 100 MMHG | BODY MASS INDEX: 32.47 KG/M2 | HEIGHT: 60 IN | TEMPERATURE: 98 F | WEIGHT: 165.38 LBS | DIASTOLIC BLOOD PRESSURE: 52 MMHG

## 2024-05-08 DIAGNOSIS — D50.9 MICROCYTIC ANEMIA: Chronic | ICD-10-CM

## 2024-05-08 DIAGNOSIS — H81.11 BENIGN PAROXYSMAL POSITIONAL VERTIGO OF RIGHT EAR: Primary | Chronic | ICD-10-CM

## 2024-05-08 DIAGNOSIS — H81.11 BENIGN PAROXYSMAL POSITIONAL VERTIGO OF RIGHT EAR: ICD-10-CM

## 2024-05-08 LAB
BASOPHILS # BLD AUTO: 0.03 K/UL (ref 0–0.2)
BASOPHILS NFR BLD: 0.5 % (ref 0–1.9)
DIFFERENTIAL METHOD BLD: ABNORMAL
EOSINOPHIL # BLD AUTO: 0.1 K/UL (ref 0–0.5)
EOSINOPHIL NFR BLD: 2.4 % (ref 0–8)
ERYTHROCYTE [DISTWIDTH] IN BLOOD BY AUTOMATED COUNT: 19.3 % (ref 11.5–14.5)
FERRITIN SERPL-MCNC: 11 NG/ML (ref 20–300)
HCT VFR BLD AUTO: 36.1 % (ref 37–48.5)
HGB BLD-MCNC: 11 G/DL (ref 12–16)
IMM GRANULOCYTES # BLD AUTO: 0 K/UL (ref 0–0.04)
IMM GRANULOCYTES NFR BLD AUTO: 0 % (ref 0–0.5)
IRON SERPL-MCNC: 100 UG/DL (ref 30–160)
LYMPHOCYTES # BLD AUTO: 1.8 K/UL (ref 1–4.8)
LYMPHOCYTES NFR BLD: 30.4 % (ref 18–48)
MCH RBC QN AUTO: 24.6 PG (ref 27–31)
MCHC RBC AUTO-ENTMCNC: 30.5 G/DL (ref 32–36)
MCV RBC AUTO: 81 FL (ref 82–98)
MONOCYTES # BLD AUTO: 0.4 K/UL (ref 0.3–1)
MONOCYTES NFR BLD: 6.5 % (ref 4–15)
NEUTROPHILS # BLD AUTO: 3.5 K/UL (ref 1.8–7.7)
NEUTROPHILS NFR BLD: 60.2 % (ref 38–73)
NRBC BLD-RTO: 0 /100 WBC
PLATELET # BLD AUTO: 272 K/UL (ref 150–450)
PMV BLD AUTO: 11.2 FL (ref 9.2–12.9)
RBC # BLD AUTO: 4.47 M/UL (ref 4–5.4)
SATURATED IRON: 24 % (ref 20–50)
TOTAL IRON BINDING CAPACITY: 422 UG/DL (ref 250–450)
TRANSFERRIN SERPL-MCNC: 285 MG/DL (ref 200–375)
WBC # BLD AUTO: 5.85 K/UL (ref 3.9–12.7)

## 2024-05-08 PROCEDURE — 83540 ASSAY OF IRON: CPT | Performed by: FAMILY MEDICINE

## 2024-05-08 PROCEDURE — 36415 COLL VENOUS BLD VENIPUNCTURE: CPT | Performed by: FAMILY MEDICINE

## 2024-05-08 PROCEDURE — 3074F SYST BP LT 130 MM HG: CPT | Mod: CPTII,,, | Performed by: FAMILY MEDICINE

## 2024-05-08 PROCEDURE — 82728 ASSAY OF FERRITIN: CPT | Performed by: FAMILY MEDICINE

## 2024-05-08 PROCEDURE — 3008F BODY MASS INDEX DOCD: CPT | Mod: CPTII,,, | Performed by: FAMILY MEDICINE

## 2024-05-08 PROCEDURE — 85025 COMPLETE CBC W/AUTO DIFF WBC: CPT | Performed by: FAMILY MEDICINE

## 2024-05-08 PROCEDURE — 99214 OFFICE O/P EST MOD 30 MIN: CPT | Mod: S$PBB,,, | Performed by: FAMILY MEDICINE

## 2024-05-08 PROCEDURE — 3078F DIAST BP <80 MM HG: CPT | Mod: CPTII,,, | Performed by: FAMILY MEDICINE

## 2024-05-08 PROCEDURE — 99999 PR PBB SHADOW E&M-EST. PATIENT-LVL V: CPT | Mod: PBBFAC,,, | Performed by: FAMILY MEDICINE

## 2024-05-08 PROCEDURE — 1160F RVW MEDS BY RX/DR IN RCRD: CPT | Mod: CPTII,,, | Performed by: FAMILY MEDICINE

## 2024-05-08 PROCEDURE — 1159F MED LIST DOCD IN RCRD: CPT | Mod: CPTII,,, | Performed by: FAMILY MEDICINE

## 2024-05-08 PROCEDURE — 99215 OFFICE O/P EST HI 40 MIN: CPT | Mod: PBBFAC,PN | Performed by: FAMILY MEDICINE

## 2024-05-12 PROBLEM — D50.9 MICROCYTIC ANEMIA: Chronic | Status: ACTIVE | Noted: 2024-05-12

## 2024-05-12 PROBLEM — H81.11 BENIGN PAROXYSMAL POSITIONAL VERTIGO OF RIGHT EAR: Chronic | Status: ACTIVE | Noted: 2024-05-12

## 2024-05-13 NOTE — PROGRESS NOTES
Patient Name: Ramiro Rubio    : 1985  MRN: [id number]      Subjective:     Patient ID: Ramiro WAYNE is a 38 y.o. female    Chief Complaint:  Dizziness    History of Present Illness  The patient is a 41-year-old female who presents for evaluation of vertigo. She is accompanied by her 5-year-old child.    The patient has been experiencing vertigo for a duration of 4 weeks, accompanied by otalgia. She has been self-administering Excedrin daily to manage her headaches, occasionally localized behind the eyes, suggesting a possible migraine. She continues to experience dysgeusia, which she attributes to a recent viral infection. Initially, she was unable to maintain a straight gait, but this has improved and she is now capable of ascending stairs. Her primary concern is her vertigo, and she has requested a referral for vestibular rehabilitation. She reports difficulty in computer work due to the vertigo. Initially, she experienced dizziness when lying on her right side, but this has since improved. She has been sleeping on her left side for the past 4 weeks, but experiences vertigo when attempting to sleep on her left side. She also reports nausea and a tickling sensation in her eye.    The patient reports a persistent trembling in her hand for the past 2 weeks. She has undergone an MRI with contrast. She attributes the vertigo to her recent viral infection, which occurred 3 days post-treatment. The tremors are bothersome to her, preventing her from completing tasks.    The patient's iron and vitamin D levels are significantly low, and she began taking iron supplements last week. She experiences her menstrual cycle every 4 weeks, lasting 4 to 5 days without clots. She has noticed a white discoloration around her eye, resembling an off-cream hue. She was previously on prenatal vitamins, but has since discontinued them. Her current regimen includes iron, multivitamin, probiotics, and vitamin D.      Review of Systems    Constitutional:  Negative for unexpected weight change.   Respiratory:  Negative for shortness of breath.    Cardiovascular:  Negative for chest pain and palpitations.   Gastrointestinal:  Negative for abdominal pain.   Genitourinary:  Negative for difficulty urinating.   Neurological:  Positive for dizziness and light-headedness.        Objective:   BP (!) 100/52 (BP Location: Right arm, Patient Position: Sitting, BP Method: Medium (Manual))   Pulse 89   Temp 98.2 °F (36.8 °C) (Oral)   Ht 5' (1.524 m)   Wt 75 kg (165 lb 5.5 oz)   LMP 05/02/2024 (Approximate)   SpO2 98%   Breastfeeding No   BMI 32.29 kg/m²     Physical Exam    Physical Exam  Vitals reviewed.   Constitutional:       Appearance: She is well-developed.   HENT:      Head: Normocephalic and atraumatic.      Right Ear: External ear normal.      Left Ear: External ear normal.      Nose: Nose normal.      Mouth/Throat:      Pharynx: No oropharyngeal exudate.   Eyes:      General:         Right eye: No discharge.         Left eye: No discharge.      Conjunctiva/sclera: Conjunctivae normal.      Pupils: Pupils are equal, round, and reactive to light.   Neck:      Trachea: No tracheal deviation.   Cardiovascular:      Rate and Rhythm: Normal rate and regular rhythm.      Heart sounds: Normal heart sounds. No murmur heard.  Pulmonary:      Effort: Pulmonary effort is normal.      Breath sounds: Normal breath sounds. No wheezing or rales.   Abdominal:      General: Bowel sounds are normal.      Palpations: Abdomen is soft. Abdomen is not rigid. There is no mass.      Tenderness: There is no abdominal tenderness. There is no guarding.   Musculoskeletal:      Cervical back: Normal range of motion and neck supple.   Lymphadenopathy:      Cervical: No cervical adenopathy.   Neurological:      Mental Status: She is oriented to person, place, and time.      Gait: Gait normal.      Comments: Martin-Hallpike maneuver positive on right            Assessment         ICD-10-CM ICD-9-CM   1. Benign paroxysmal positional vertigo of right ear  H81.11 386.11   2. Microcytic anemia  D50.9 280.9         Plan:     Assessment & Plan  1. Benign Paroxysmal Positional Vertigo.  A referral for vestibular rehabilitation has been initiated. Additionally, a referral to an Ear, Nose, and Throat (ENT) specialist has been made.    2. Anemia.  Will check iron studies     ORDERS  1. Benign paroxysmal positional vertigo of right ear  -     Ambulatory referral/consult to Physical/Occupational Therapy; Future; Expected date: 05/15/2024  -     Ambulatory referral/consult to ENT; Future; Expected date: 05/15/2024    2. Microcytic anemia  -     CBC Auto Differential; Future; Expected date: 05/08/2024  -     Iron and TIBC; Future; Expected date: 05/08/2024  -     Ferritin; Future; Expected date: 05/08/2024             -Wild Leiva Jr., MD, AAHIVS      This note was generated with the assistance of ambient listening technology. Verbal consent was obtained by the patient and accompanying visitor(s) for the recording of patient appointment to facilitate this note. I attest to having reviewed and edited the generated note for accuracy, though some syntax or spelling errors may persist. Please contact the author of this note for any clarification.          There are no Patient Instructions on file for this visit.    Follow Up  No follow-ups on file.    Future Appointments   Date Time Provider Department Center   5/16/2024  2:45 PM Huong Finley OT [facility]   6/20/2024 11:00 AM Sabas Hutchinson MD [facility]   9/23/2024 11:00 AM Radha Thomas MD [facility]

## 2024-05-16 ENCOUNTER — CLINICAL SUPPORT (OUTPATIENT)
Dept: REHABILITATION | Facility: HOSPITAL | Age: 39
End: 2024-05-16
Attending: FAMILY MEDICINE
Payer: MEDICAID

## 2024-05-16 DIAGNOSIS — H81.11 BENIGN PAROXYSMAL POSITIONAL VERTIGO OF RIGHT EAR: Chronic | ICD-10-CM

## 2024-05-16 NOTE — PROGRESS NOTES
[facility] Occupational Therapy  Initial Neurological Evaluation     Date: 5/16/2024  Patient: Ramiro Rubio  Chart Number: [id number]    Therapy Diagnosis:   Encounter Diagnosis   Name Primary?    Benign paroxysmal positional vertigo of right ear      Physician: Wild Leiva Jr., MD    Physician Orders: Vestibular therapy - eval and treat  Medical Diagnosis: BPPV   Evaluation Date: 5/16/2024  Plan of Care Expiration Period: 07/26/2024  Insurance Authorization period Expiration: 12/31/2024  Date of Return to MD: TBD  Visit # / Visits Authorized: 1 / 1  FOTO:       Time In:1445  Time Out: 1530  Total Billable (one on one) Time: 45 minutes    Precautions: Standard    Subjective     History of Current Condition:    Had a positive right Martin Hallpike on 5/8/24 at visit with Dr. Leiva. Pt believes it was originally on both sides, but is now only on right and is getting a little better. Body has been cramping, hurting, and has been experiencing nausea. Positional changes still triggering, including bending down. Recent imaging suggestive of possible demyelinating disease; referral for neurology placed. Recently visited [facility] where she had x2 Epley maneuvers and reports no room-spinning since.     Balance was really bad but has improved. Was having issues getting up and down steps. Sometimes gets headache behind right eye.     Has to be the  in the car or else will get super motion sick.      Ramiro Rubio is a 38 y.o. female who presents to [facility] for evaluation and treatment secondary to above-described dizziness. Deficits appear to be a combination of peripheral vestibular deficits .      Functional Limitations/Social History:    Prior Level of Function: independent  Current Level of Function: independent to modified independent pending symptoms; having to limit some participation in day to day activities when feeling dizzy       Past Medical  History/Physical Systems Review:     Past Medical History:  Ramiro Rubio  has a past medical history of Abnormal Pap smear of cervix and Acute blood loss anemia.    Past Surgical History:  Ramiro Rubio  has a past surgical history that includes Cryoablation and Lymph node biopsy.    Current Medications:  Ramiro WAYNE has a current medication list which includes the following prescription(s): boil relief, prednisone, and promethazine.    Allergies:  Review of patient's allergies indicates:   Allergen Reactions    Fluconazole Hives        Objective     Cognitive Exam:  Normal     Oculomotor Exam: WNL unless otherwise noted  Oculomotor ROM:   Eye Alignment:   Visual Field:   Acuity:     Spontaneous Nystagmus:   Gaze Holding Nystagmus:   Gaze Holding (No Fixation):   Smooth Pursuits:     Horizontal:   Vertical:   Saccades:    Horizontal:   Vertical:   Near Point Convergence (cm):   Accommodation:   Fixation (5 second sustained visual attention):     VOR: slightly symptomatic    Slow Head Movement: NT  Head Thrust: NT  Dynamic Visual Acuity (DVA): NT  VOR Cancellation: slightly symptomatic  Head Shake: NT  Cover/Cross Cover: NT  Cover/Uncover: NT    Physical Exam: WNL posture, cervical, and BUE range of motion. Strength and coordination not tested but no complaints of deficits.       Endurance Deficit: none noted at eval    Balance/Functional Mobility Assessment:       Postural control: MCTSIB: Evaluation 05/20/2024 (Average of 3 trials)   1. Eyes Open/feet together/Firm: 30 seconds   2. Eyes Closed/feet together/Firm:  30 seconds with min sway   3. Eyes Open/feet together/Foam:  30 seconds   4. Eyes Closed/feet together/Foam:  30 seconds with mod sway    TOTAL 120/120     Static Postural Control:   - Sharpened Romberg:    Eyes Open: 30 seconds    Eyes Closed: 15 seconds     Fukuda: 20* turn to left     Modified VAS (Vertebral Artery Screen), in sitting (rotation, then extension):  R: ***  L: ***    Positional Canal Testing:  "  Looking for nystagmus (slow phase followed by quick phase to the affected side for BPPV)    Mullin Hallpike (posterior / CL anterior)   Right : {POSITIVE/NEGATIVE:03327} nystagmus, {POSITIVE/NEGATIVE:38637} dizziness   Left: {POSITIVE/NEGATIVE:68439} nystagmus, {POSITIVE/NEGATIVE:81550} dizziness  Horizontal Canals   Right: {POSITIVE/NEGATIVE:} nystagmus, {POSITIVE/NEGATIVE:} dizziness   Left: {POSITIVE/NEGATIVE:} nystagmus, {POSITIVE/NEGATIVE:24436} dizziness    Treatment Performed:    ***Epley maneuver performed to treat {LEFT/RIGHT:16197::" "} ***PSCC/HSSC/ASCC BPPV   Position 1: {cpposne} nystagmus lasting *** seconds; {cpposne} dizziness   Position 2: {cpposne} nystagmus lasting *** seconds; {cpposne} dizziness   Position 3: {cpposne} nystagmus lasting *** seconds; {cpposne} dizziness   Position 4: {cpposne} nystagmus lasting *** seconds; {cpposne} dizziness    Functional Status      Functional Mobility:  Bed mobility: {AMB OT NEURO ASSISTANCE:21483}  Roll to left: {AMB OT NEURO ASSISTANCE:83343}  Roll to right: {AMB OT NEURO ASSISTANCE:44483}  Supine to sit: {AMB OT NEURO ASSISTANCE:88357}  Sit to supine: {AMB OT NEURO ASSISTANCE:85487}  Transfers to bed: {AMB OT NEURO ASSISTANCE:81783}  Transfers to toilet: {AMB OT NEURO ASSISTANCE:88878}  Car transfers: {AMB OT NEURO ASSISTANCE:94257}  Wheelchair mobility: {AMB OT NEURO ASSISTANCE:99396}    ADL's:  Feeding: {AMB OT NEURO ASSISTANCE:85164}  Grooming: {AMB OT NEURO ASSISTANCE:30366}  Hygiene: {AMB OT NEURO ASSISTANCE:77428}  UB Dressing: {AMB OT NEURO ASSISTANCE:22893}  LB Dressing: {AMB OT NEURO ASSISTANCE:49019}  Toileting: {AMB OT NEURO ASSISTANCE:84388}  Bathing: {AMB OT NEURO ASSISTANCE:55722}    IADL's:  Homecare: {AMB OT NEURO ASSISTANCE:45888}  Cooking: {AMB OT NEURO ASSISTANCE:06556}  Laundry: {AMB OT NEURO ASSISTANCE:54813}  Yard work: {AMB OT NEURO ASSISTANCE:08413}  Use of " "telephone: {AMB OT NEURO ASSISTANCE:15161}  Money management: {AMB OT NEURO ASSISTANCE:40174}  Medication management: {AMB OT NEURO ASSISTANCE:82281}  Handwriting:{AMB OT NEURO ASSISTANCE:28039}  Technology Use:{AMB OT NEURO ASSISTANCE:07243}    Comments:    CMS Impairment/Limitation/Restriction for FOTO *** Survey    Therapist reviewed FOTO scores for Ramiro Rubio on 5/16/2024.   FOTO documents entered into [employer] - see Media section.    Limitation Score: ***%  Category: {Blank single:97783::"Other","Self Care","Body Position","Carrying","Mobility"}       - Dizziness Handicap Inventory (DHI):   16-34= mild   36-53= moderate   >/= 54= severe  - Activities-Specific Balance Confidence Scale (ABC):     Treatment     Treatment Time In: ***  Treatment Time Out: ***  Total Treatment time separate from Evaluation time:***    Ramiro participated in dynamic functional therapeutic activities to improve functional performance for ***  minutes, including:  ***    Ramiro participated in neuromuscular re-education activities to improve: {AMB PT PROGRESS NEURO RE-ED:42594} for *** minutes. The following activities were included:  ***    Ramiro received therapeutic exercises to develop {AMB PT PROGRESS OBJECTIVE:47616} for *** minutes including:  ***    Ramiro received the following manual therapy techniques: {AMB PT PROGRESS MANUAL THERAPY:96846} were applied to the: *** for *** minutes, including:  ***    Ramiro received the following supervised modalities after being cleared for contradictions: {AMB PT SUPERVISED MODES:34035}  ***    Ramiro received the following direct contact modalities after being cleared for contraindications: {AMB PT PROGRESS DIRECT CONTACT MODES:79828}  ***    Home Exercises and Patient Education Provided    Education provided:   -role of OT, goals for OT, scheduling/cancellations, insurance limitations with patient.  -Additional Education provided: ***    Written Home Exercises Provided: {Blank " "single:15329::"yes","Patient instructed to cont prior HEP"}.  Exercises were reviewed and Ramiro was able to demonstrate them prior to the end of the session.    Ramiro demonstrated {Desc; good/fair/poor:44553} understanding of the education provided.     See EMR under {Blank single:20757::"Media","Patient Instructions"} for exercises provided {Blank single:62448::"8/13/2021","prior visit"}.    Assessment     Ramiro Rubio is a 38 y.o. female referred to outpatient occupational therapy and presents with a medical diagnosis of ***, resulting in {AMB OT NEURO IMPAIRMENTS:29353} and demonstrates limitations as described in the chart below. Following medical record review it is determined that patient will benefit from occupational therapy services in order to maximize pain free and/or functional use of {LEFT/RIGHT:05478} ***.    Patient prognosis is {REHAB PROGNOSIS OHS:82314} due to  ***  Patient will benefit from skilled outpatient Occupational Therapy to address the deficits stated above and in the chart below, provide patient/family education, and to maximize patient's level of independence.     Plan of care discussed with patient: {YES:30990}  Patient's spiritual, cultural and educational needs considered and patient is agreeable to the plan of care and goals as stated below:     Anticipated Barriers for therapy: ***    Medical Necessity is demonstrated by the following  Occupational Profile/History  Co-morbidities and personal factors that may impact the plan of care [] LOW: Brief chart review  [] MODERATE: Expanded chart review   [] HIGH: Extensive chart review    Moderate / High Support Documentation: ***     Examination  Performance deficits relating to physical, cognitive or psychosocial skills that result in activity limitations and/or participation restrictions  [] LOW: addressing 1-3 Performance deficits  [] MODERATE: 3-5 Performance deficits  [] HIGH: 5+ Performance deficits (please support below)    Moderate " "/ High Support Documentation:    Physical:  {Physical:02712}    Cognitive:  {Cognitive:00455}    Psychosocial:    {Psychosocial:49246}     Treatment Options [] LOW: Limited options  [] MODERATE: Several options  [] HIGH: Multiple options      Decision Making/ Complexity Score: {Desc; low/moderate/high:177925}         The following goals were discussed with the patient and patient is in agreement with them as to be addressed in the treatment plan.     Patient's Goals for Therapy: ***    Goals:     Short Term Goals: *** weeks    Pt will be independent with Home Exercise Program (HEP) to promote long term maintenance of progress made in therapy.     Pt will complete *** assessment in *** sec/min or better using *** UE in order to improve overall coordination with ***    Pt will improve *** pinch strength by ***# in order to increase safety and participation with ***    Pt will improve ***  strength by ***# in order to increase safety and participation with ***                Long Term Goals: *** weeks    Pt will score *** on the FOTO by discharge in order to improve QOL and independence with meaningful activities                       Goals to be added or modified as necessary.    Plan     Certification Period/Plan of care expiration: 5/16/2024 to ***.    Outpatient Occupational Therapy {NUMBERS 1-5:75827} times weekly for {0-10:20507::"0"} weeks to include the following interventions: {TX PLAN:77104}.    Huong Finley OT      I certify the need for these services furnished under this plan of treatment and while under my care.  ____________________________________ Physician/Referring Practitioner   Date of Signature          "

## 2024-05-21 ENCOUNTER — HOSPITAL ENCOUNTER (EMERGENCY)
Facility: HOSPITAL | Age: 39
Discharge: HOME | End: 2024-05-21
Attending: EMERGENCY MEDICINE
Payer: MEDICAID

## 2024-05-21 VITALS
RESPIRATION RATE: 18 BRPM | OXYGEN SATURATION: 100 % | BODY MASS INDEX: 32.03 KG/M2 | SYSTOLIC BLOOD PRESSURE: 136 MMHG | HEART RATE: 91 BPM | WEIGHT: 164 LBS | DIASTOLIC BLOOD PRESSURE: 77 MMHG | TEMPERATURE: 98 F

## 2024-05-21 DIAGNOSIS — R51.9 ACUTE INTRACTABLE HEADACHE, UNSPECIFIED HEADACHE TYPE: Primary | ICD-10-CM

## 2024-05-21 DIAGNOSIS — H81.11 BENIGN PAROXYSMAL POSITIONAL VERTIGO OF RIGHT EAR: ICD-10-CM

## 2024-05-21 LAB
GLUCOSE SERPL-MCNC: 97 MG/DL (ref 70–110)
POCT GLUCOSE: 97 MG/DL (ref 70–110)

## 2024-05-21 PROCEDURE — 96372 THER/PROPH/DIAG INJ SC/IM: CPT

## 2024-05-21 PROCEDURE — 99284 EMERGENCY DEPT VISIT MOD MDM: CPT | Mod: 25

## 2024-05-21 PROCEDURE — 63600175 PHARM REV CODE 636 W HCPCS

## 2024-05-21 PROCEDURE — 25000003 PHARM REV CODE 250

## 2024-05-21 PROCEDURE — 82962 GLUCOSE BLOOD TEST: CPT

## 2024-05-21 RX ORDER — IBUPROFEN 800 MG/1
800 TABLET ORAL EVERY 6 HOURS PRN
Qty: 20 TABLET | Refills: 0 | Status: SHIPPED | OUTPATIENT
Start: 2024-05-21

## 2024-05-21 RX ORDER — ACETAMINOPHEN 500 MG
1000 TABLET ORAL
Status: COMPLETED | OUTPATIENT
Start: 2024-05-21 | End: 2024-05-21

## 2024-05-21 RX ORDER — KETOROLAC TROMETHAMINE 30 MG/ML
30 INJECTION, SOLUTION INTRAMUSCULAR; INTRAVENOUS
Status: COMPLETED | OUTPATIENT
Start: 2024-05-21 | End: 2024-05-21

## 2024-05-21 RX ORDER — DEXAMETHASONE SODIUM PHOSPHATE 4 MG/ML
12 INJECTION, SOLUTION INTRA-ARTICULAR; INTRALESIONAL; INTRAMUSCULAR; INTRAVENOUS; SOFT TISSUE
Status: COMPLETED | OUTPATIENT
Start: 2024-05-21 | End: 2024-05-21

## 2024-05-21 RX ORDER — SCOLOPAMINE TRANSDERMAL SYSTEM 1 MG/1
1 PATCH, EXTENDED RELEASE TRANSDERMAL
Qty: 15 PATCH | Refills: 0 | Status: SHIPPED | OUTPATIENT
Start: 2024-05-21

## 2024-05-21 RX ADMIN — KETOROLAC TROMETHAMINE 30 MG: 30 INJECTION, SOLUTION INTRAMUSCULAR at 07:05

## 2024-05-21 RX ADMIN — ACETAMINOPHEN 1000 MG: 500 TABLET ORAL at 07:05

## 2024-05-21 RX ADMIN — DEXAMETHASONE SODIUM PHOSPHATE 12 MG: 4 INJECTION INTRA-ARTICULAR; INTRALESIONAL; INTRAMUSCULAR; INTRAVENOUS; SOFT TISSUE at 07:05

## 2024-05-21 NOTE — PROGRESS NOTES
P[facility] Physical Therapy   Treatment Note     Name: Ramiro Rubio  Clinic Number: [id number]    Therapy Diagnosis:   Encounter Diagnoses   Name Primary?    Pelvic floor dysfunction Yes    Coordination impairment      Referring Provider: Sabas Hutchinson MD    Visit Date: 5/22/2024    Referring Provider Orders: PT Eval and Treat  Medical Diagnosis from Referral: OAB (overactive bladder) [N32.81]   Evaluation Date: 4/15/2024  Authorization Period Expiration: 12/31/2024           Plan of Care Expiration: 7/15/2024  Visit # / Visits authorized: 2/pending  FOTO: 1 (4/15/2024)    Cancelled Visits: -  No Show Visits: -    Time In: 12:41  Time Out: 13:20  Total Billable Time: 38 minutes    Precautions: standard    Subjective     Ramiro reports continued urgency and urge incontinence. She practices urge suppression, but it doesn't always work. One episode of fecal incontinence with semi-formed stool.  Pt had 2 days of symptom-relief following Epley maneuvers that lasted until she performed vestibular exercises (might have been too aggressive).    She was compliant with home exercise program.  Response to previous treatment: no adverse effect  Functional change: none reported    Pain: none reported    Objective     Ramrio received the following interventions during the treatment session:   (TrA = transverse abdominis, PFM = pelvic floor muscle, sEMG = surface electromyography)  Pt consented to pelvic floor muscle assessment and treatment on 5/22/24. See EMR.    Therapeutic activities to improve functional performance for 30 minutes -  [x] Pelvic floor and abdominal wall assessment - see below  [x] Instruction on urge suppression techniques  [x] HEP building/HEP review    Neuromuscular re-education activities to develop balance, coordination, kinesthetic sense, motor control, proprioception, and posture for 8 minutes -   [x] PFM squeeze without muscle substitution - able to demonstrate correctly with verbal cues  [x] Bent knee  fall outs with focus on pelvic girdle stability (R&L) - easy  [x] TrA brace + straight leg raise (R&L) - easy  [x] PFM squeeze + bridging, x20 with 5-second hold    -----------------    ABDOMINAL WALL ASSESSMENT  Palpation: no tenderness except over bladder  Pelvic Girdle Stability: Pt demonstrates mildly impaired ability to stabilize pelvis with Active Straight Leg Raise Test.   Diastasis Recti: absent    VAGINAL PELVIC FLOOR EXAM - external assessment  Introitus: WNL  Skin condition: WNL  Scarring: none   Sensation: WNL   Pain: none  Voluntary contraction: visible lift and accessory muscle use  Voluntary relaxation: visible drop  Bearing down: bulge     VAGINAL PELVIC FLOOR EXAM - internal assessment  Pain: none   Sensation: able to localized pressure appropriately   Vaginal vault: WNL   Muscle Bulk: normal tone  Muscle Power: 3/5  Muscle Endurance: 10 seconds  # Reps To Fatigue: >3       Quality of contraction: slow relaxation   Specificity: WNL   Coordination: WNL   Prolapse check: none     Home Exercises and Patient Education     Patient Education: progression of plan of care, plan for next session, pelvic floor anatomy & function, relationship between TrA & PFM, relationship between hips & PFM, urge suppression, and etiology of urinary urgency    Home Exercise Program (4/15/24): urge suppression  Home Exercise Program Updates (5/22/24): kegels, kegel + bridging    Exercises were reviewed, and Ramiro was able to demonstrate them prior to the end of the session, as needed. Ramiro demonstrated good understanding of the education provided.     Assessment     Pt tolerated treatment session well, with good understanding of education provided and improving pelvic floor coordination within session. Abdominal wall and pelvic floor assessment reveal pelvic floor strength deficits which could contribute to symptoms. Testing pelvic floor strengthening with HEP to determine if that can improve urgency and incontinence. Pt's  functional mobility and ability to perform ADLs still limited by pelvic floor dysfunction. She requires skilled therapy for continued patient education and coordination retraining.      Ramiro is progressing well towards her goals.   Pt prognosis: good    Pt will continue to benefit from skilled outpatient physical therapy to address the deficits listed in the problem list box on initial evaluation, provide pt/family education and to maximize pt's level of independence in the home and community environment.     Pt's spiritual, cultural and educational needs considered and pt agreeable to plan of care and goals.  Anticipated barriers to physical therapy: none      Short Term Goals: 6 weeks   Pt to report >50% improvement in urine leakage - NOT MET  Pt to report >50% improvement in fecal leakage - NOT MET  Pt to verbalize urge suppression strategies for improved control over urgency and urge urinary incontinence - MET  Pt to be independent with introductory home exercise program - MET      Long Term Goals: 12 weeks   Pt to deny urinary incontinence to demonstrate improved pelvic floor coordination - NOT MET   Pt to deny fecal incontinence to demonstrate improved pelvic floor coordination - NOT MET   Pt to score no more than 25% impairment on the Bowel Leakage FOTO survey to demonstrate reduced impairment due to pelvic floor dysfunction - NOT MET   Pt to be independent with advanced home exercise program - NOT MET      Plan     Continue per Plan of Care      Pura Vernon, PT, DPT

## 2024-05-22 ENCOUNTER — CLINICAL SUPPORT (OUTPATIENT)
Dept: REHABILITATION | Facility: OTHER | Age: 39
End: 2024-05-22
Attending: FAMILY MEDICINE
Payer: MEDICAID

## 2024-05-22 DIAGNOSIS — R27.8 COORDINATION IMPAIRMENT: ICD-10-CM

## 2024-05-22 DIAGNOSIS — M62.89 PELVIC FLOOR DYSFUNCTION: Primary | ICD-10-CM

## 2024-05-22 PROCEDURE — 97110 THERAPEUTIC EXERCISES: CPT | Mod: PN | Performed by: PHYSICAL THERAPIST

## 2024-05-22 NOTE — ED PROVIDER NOTES
"Encounter Date: 5/21/2024    SCRIBE #1 NOTE: I, Hong Cristina, am scribing for, and in the presence of,  Peggy Rodriguez PA-C.       History     Chief Complaint   Patient presents with    Dizziness     "Vertigo" for 6 weeks, tremor and HA to top of head and generalized weakness. Has had MRI done and is in physical therapy as well.      38 y.o. female with PMHx of anemia and BPPV presents for emergent evaluation of acute onset atraumatic headache to the top of the head that started 1 day ago.  She reports frequent headaches most notably to right side of her head usually behind her eye with a pressure sensation but is concerned because she has never had a headache in this location.  She states that she has been struggling with vertigo over the last 6 weeks for which she has had regular follow up with her PCP along with MRI completed and diagnosed with BPPV and currently seeing physical therapy who completed epley maneuvers about an hour prior to onset of this headache.  She has not taken any medication for this.  She states that vertigo started shortly after a stomach virus in April and MRI is suspicious of potential MS but PCP is ordering a repeat MRI along with having her follow up neurology which is scheduled in September.  She reports associated extremity weaknesses and hand tremors but denies any falls or other trauma.  She reports that these increased during episodes of vertigo but denies any new or worsening symptoms.  She has a follow up appointment with PCP on 5/28.  She states that vertigo flares up when she is lying down and does not currently have symptoms at this time.  She drove herself here today.  She is currently wearing a scopolamine patch when she does feel like has helped her symptoms. Patient denies fever/chills, vision changes, chest pain, shortness of breath, abdominal pain, nausea/vomiting/diarrhea, urinary concerns, myalgias, or any other complaints at this time. LMP 4/25.  Denies any heavy " menstrual periods, hematuria or hematochezia. Denies any recent trauma, injuries or falls.       The history is provided by the patient. No  was used.     Review of patient's allergies indicates:   Allergen Reactions    Fluconazole Hives     Past Medical History:   Diagnosis Date    Abnormal Pap smear of cervix 2008    Acute blood loss anemia 07/23/2019     Past Surgical History:   Procedure Laterality Date    CRYOABLATION      LYMPH NODE BIOPSY       Family History   Problem Relation Name Age of Onset    Breast cancer Neg Hx      Colon cancer Neg Hx      Ovarian cancer Neg Hx       Social History     Tobacco Use    Smoking status: Never    Smokeless tobacco: Never   Substance Use Topics    Alcohol use: Never    Drug use: No     Review of Systems   Constitutional:  Negative for chills and fever.   HENT:  Negative for facial swelling, sore throat and trouble swallowing.    Eyes:  Negative for photophobia, pain and visual disturbance.   Respiratory:  Negative for shortness of breath and wheezing.    Cardiovascular:  Negative for chest pain.   Gastrointestinal:  Negative for abdominal pain, diarrhea, nausea and vomiting.   Genitourinary:  Negative for decreased urine volume, dysuria and hematuria.   Musculoskeletal:  Negative for gait problem and myalgias.   Skin:  Negative for rash.   Neurological:  Positive for tremors, weakness and headaches. Negative for dizziness, seizures and syncope.   Psychiatric/Behavioral: Negative.         Physical Exam     Initial Vitals [05/21/24 1823]   BP Pulse Resp Temp SpO2   136/77 91 18 98.4 °F (36.9 °C) 100 %      MAP       --         Physical Exam    Nursing note and vitals reviewed.  Constitutional: She appears well-developed and well-nourished. She is not diaphoretic. No distress.   HENT:   Head: Normocephalic and atraumatic.   Right Ear: External ear normal.   Left Ear: External ear normal.   Mouth/Throat: Oropharynx is clear and moist.   NO NYSTAGMUS   Eyes:  Conjunctivae and EOM are normal. Pupils are equal, round, and reactive to light. No scleral icterus.   Neck: Neck supple.   Normal range of motion.  Cardiovascular:  Normal rate, regular rhythm and normal heart sounds.           Pulmonary/Chest: No stridor. No respiratory distress. She has no wheezes.   Abdominal: Abdomen is soft. Bowel sounds are normal. She exhibits no distension. There is no abdominal tenderness.   Musculoskeletal:         General: Normal range of motion.      Cervical back: Normal range of motion and neck supple.      Comments: 5/5 strength in all extremities with no decrease in sensation.  Patient has full range of motion of all extremities.  2+ radial pulses.  2+ dorsalis pedis pulse. Ambulating without difficulty.  HINTS NEG.     Lymphadenopathy:     She has no cervical adenopathy.   Neurological: She is alert and oriented to person, place, and time. She has normal strength and normal reflexes. No cranial nerve deficit or sensory deficit.   Skin: No rash noted.   Psychiatric: She has a normal mood and affect. Her behavior is normal. Thought content normal.         ED Course   Procedures  Labs Reviewed   POCT GLUCOSE MONITORING CONTINUOUS   POCT GLUCOSE          Imaging Results    None          Medications   ketorolac injection 30 mg (30 mg Intramuscular Given 5/21/24 1955)   dexAMETHasone injection 12 mg (12 mg Intramuscular Given 5/21/24 1955)   acetaminophen tablet 1,000 mg (1,000 mg Oral Given 5/21/24 1955)     Medical Decision Making  This is an evaluation of a 38 y.o. female that presents to the Emergency Department for acute onset, atraumatic headache. The patient is a non-toxic, afebrile, and well appearing female.  She also reports episodes of vertigo along with tremors.  Currently has follow up with PCP and scheduled for Neurology evaluation due to abnormal MRI suspicious of MS.  She has no photophobia or changes in vision. She has no focal weakness or neurological deficits. Has full  ROM of neck with no nuchal rigidity or meningeal signs. There is no staggering or ataxic gait, vomiting, double vision, visual loss, slurred speech, numbness of the face or body, weakness, clumsiness, or incoordination.    Vital Signs are Reassuring.  Given IM Toradol, IM dexamethasone and Tylenol in ED.    Given the above findings, I do not think the patient has meningitis, SAH\ICH, intracranial mass, migraine headache, tension headache, neoplasm.    On re-evaluation, patient notes complete relief of symptoms.  Ambulating without difficulty.  5/5 strength in all extremities.  No weakness or tremors noted on exam.  Patient expresses concern due to long time between neurology follow up.  Requesting repeat MRI here today.  I explained that there no emergent causes for MRI at this time.  I will place a referral for Neurology to see if this helps get her in faster I also recommended that she follow up with her PCP to express concerns to see if they may help her with scheduling.  Advised Tylenol and Motrin as needed for headaches.  Also emphasized the importance of adequate water intake and sleep.  Continue to use scopolamine patches as needed for vertigo if there helping her symptom.  Emphasized the importance continuing to have good follow up with her PCP and neurologist.  The diagnosis, treatment plan, instructions for follow-up and reevaluation as well as ED return precautions have been discussed with the patient and the patient has verbalized an understanding of the information. All questions or concerns have been addressed.     Amount and/or Complexity of Data Reviewed  External Data Reviewed: labs, radiology and notes.  Labs: ordered. Decision-making details documented in ED Course.    Risk  OTC drugs.  Prescription drug management.  Diagnosis or treatment significantly limited by social determinants of health.            Scribe Attestation:   Scribe #1: I performed the above scribed service and the documentation  accurately describes the services I performed. I attest to the accuracy of the note.                           I, Peggy Rodriguez PA-C, personally performed the services described in this documentation. All medical record entries made by the scribe were at my direction and in my presence. I have reviewed the chart and agree that the record reflects my personal performance and is accurate and complete.      Clinical Impression:  Final diagnoses:  [H81.11] Benign paroxysmal positional vertigo of right ear  [R51.9] Acute intractable headache, unspecified headache type (Primary)          ED Disposition Condition    Discharge Stable          ED Prescriptions       Medication Sig Dispense Start Date End Date Auth. Provider    scopolamine (TRANSDERM-SCOP) 1.3-1.5 mg (1 mg over 3 days) Place 1 patch onto the skin every 72 hours. 15 patch 5/21/2024 -- Peggy Rodriguez PA-C    ibuprofen (ADVIL,MOTRIN) 800 MG tablet Take 1 tablet (800 mg total) by mouth every 6 (six) hours as needed for Pain. 20 tablet 5/21/2024 -- Peggy Rodriguez PA-C          Follow-up Information       Follow up With Specialties Details Why Contact Info    [facility] - Emergency Dept Emergency Medicine Go to  For new or worsening symptoms [address] [facility] [place] [address] [phone]    [facility] -  Schedule an appointment as soon as possible for a visit   [address] [place] [address]  [phone]      [facility] Surgical Oncology, Orthopedic Surgery, Genetics, Physical Medicine and Rehabilitation, Occupational Therapy, Radiology   [address] [facility] [address]  [phone]               Peggy Rodriguez PA-C  05/22/24 3628

## 2024-05-22 NOTE — DISCHARGE INSTRUCTIONS
Be sure your getting adequate rest and appropriate diet. Drink lots of fluids, stay well hydrated. Alternate Tylenol/Ibuprofen as needed for pain/ fever; go back and forth between these two medications every 4 hrs as needed for temp greater than or equal to 100.4F.  You may take 800 ibuprofen and 500 to a 1000 mg of Tylenol at 1 time.  Maximum dose of Tylenol and 1 day is 3000 mg in the maximum dose of ibuprofen and 1 day is 1200mg.      You may use scopolamine patches as needed for vertigo.  Be sure you are following up with your PCP at physical therapist for further evaluation and management of symptoms.    Follow up with Neurology as directed.    Please return to ER if you experience severe dizziness, fever higher then 100.4 that persist after medication administration, uncontrolled nausea/vomiting or diarrhea or any other major concern like increased pain, chest pain, shortness of breath, inability to pass stool or gas, or difficulty breathing or swelling of the throat/mouth/tongue.    Be sure to follow up with your primary care doctor and review all labs/imaging/tests that were performed during your ER visit with them. It is very common for us to identify non-emergent incidental findings which must be followed up with your primary care physician.  Some labs/imaging/tests may be outside of the normal range, and require non-emergent follow-up and/or further investigation/treatment/procedures/testing to help diagnose/exclude/prevent complications or other potentially serious medical conditions. Some abnormalities may not have been discussed or addressed during your ER visit.

## 2024-05-23 NOTE — PATIENT INSTRUCTIONS
Pelvic floor squeeze, 3 sets of 10  - Inhale to prepare, relax the belly and pelvic floor  - As you exhale, gently squeeze the pelvic floor (imagine stopping the flow of urine or tightening the anus)  - Inhale again to rest  *Don't hold your breath  *Can perform lying down or sitting up     Bridging, 2-3 sets of 10  - Inhale to prepare, relax the belly and pelvic floor  - As you exhale, gently squeeze the pelvic floor and lift the hips (only lift as high as it is comfortable) and lower  - Inhale again to rest  *Don't hold your breath

## 2024-05-23 NOTE — PROGRESS NOTES
[facility]   Physical Therapy  Vestibular Treatment Note      Name: Ramiro Rubio  Clinic Number: [id number]    Therapy Diagnosis:   Encounter Diagnosis   Name Primary?    Benign paroxysmal positional vertigo of right ear Yes     Physician: Wild Leiva Jr., MD    Visit Date: 5/24/2024    Referring Provider Orders: PT Eval and Treat  Medical Diagnosis from Referral: OAB (overactive bladder) [N32.81]   Evaluation Date: 4/15/2024  Authorization Period Expiration: 12/31/2024           Plan of Care Expiration: 7/15/2024  Visit # / Visits authorized: 2/pending  FOTO: 1 (4/15/2024)    Time In: 0850  Time Out: 0953  Total Billable Time: 63 minutes     Precautions: standard    Subjective     Patient reports: started with stomach virus 6 weeks ago then vertigo; May 17-18 went away and then did HEP from OT and increased symptoms; came in on Wednesday for therapy but felt worst.  She was not compliant with home exercise program; eye/head movements advised to not continue.   Response to previous treatment: some response to previous Eply in ENT  Functional change: still feels spinning room with supine with head turned to right    Pain: 0/10  Symptoms: 6-7/10; currently driving and reports that being passenger in car can provoke to symptoms; modifying driving to avoid head turning.    Objective      Objective Measures updated at progress report unless specified.     - Follows commands: 100% of time     Mental status: alert, oriented to person, place, and time, normal mood, behavior, speech, dress, motor activity, and thought processes, affect appropriate to mood  Appearance: Casually dressed and Well groomed  Behavior:  calm, cooperative, and adequate rapport can be established  Attention Span and Concentration:  Normal  Visual/Oculomotor Screen: denies changes   Ocular range of motion: WFL  Spontaneous nystagmus (fixation present): none  Gaze-evoked nystagmus (fixation present): none  Tracking/Smooth  "Pursuits:Impaired: slippage and catchup  Saccades: Impaired: overshooting  Convergence: intact  Visual field: Intact  VOR 1: Impaired: get woozy; good head stabilization  Head Thrust Test: negative    Modified VAS (Vertebral Artery Screen), in sitting (rotation, then extension):  R: negative  L: negative        CERVICAL SPINE  Flexion 90 degrees (80-90 deg)  Extension 80 degrees (70-80 deg)  L side bend 45 degrees, R side bend 45 degrees (20-45 degrees)  L rotation 90 degrees, R rotation 90 degrees (70-90 degrees)  Are concurrent symptoms present with any of these movementsPt self limits head ROM and moves slowly    ROM:   UPPER EXTREMITY--AROM/PROM  (R) UE: WFLs  (L) UE: WFLs         SOFI SENSORY TESTING: TBA  (P= Pass, F= Fail; note any sway; hold each position for 30")  Condition 1: (firm surface/feet together/eyes open) TBA  Condition 2: (firm surface/feet together/eyes closed) TBA  Condition 3: (firm surface/feet in tandem/eyes open) TBA  Condition 4: (firm surface/feet in tandem/eyes closed) TBA  Condition 5: (soft surface/feet together/eyes open) TBA  Condition 6: (soft surface/feet together/eyes closed) TBA    POSITIONAL CANAL TESTING  Looking for nystagmus (slow phase followed by quick phase to the affected side for BPPV)    Supported Martin Hallpike (posterior / CL anterior)   Right : positive; delayed onset, short duration ( 15 secs) upbeating nystagmus   Left: negative  Horizontal Canals- performed in side lying   Right: negative   Leftnegative  Treatment Performed: Right Eply x 3 reps with improving dizziness with each rep; nystagmus not noted on the last rep    Symptoms: 4/10; 2/10; 5 minutes post CRM pt reported feeling shaky but a little steadier on her feet.  Treatment     Ramiro received the treatments listed below:      therapeutic exercises to develop strength, endurance, ROM, flexibility, posture, and core stabilization and Balance, Coordination, Kinesthetic, Sense, Proprioception, and Posture for 63 " minutes.   The following activities were included:  +Objective measures as documented above  +Instruction on CRM follow up and how to complete the Right Eply at home  +Saccades vertical and horizontal self paced    Patient Education and Home Exercises       Education provided:   - increase physical activity to daily walks 20-30 minutes staying in lower HR zones  -Vestibular rehab  -Recovery for CRM  Education on self CRm    Written Home Exercises Provided: yes. Exercises were reviewed and Ramiro was able to demonstrate them prior to the end of the session.  Ramiro demonstrated fair  understanding of the education provided. See Electronic Medical Record under Patient Instructions for exercises provided during therapy sessions    Assessment   Ramiro is a 38 y.o. female referred to outpatient Physical Therapy with a medical diagnosis of Left posterior BPPV canalithiasis . Patient presents with positionally provoked vertigo.  Pt appears to have responded well to CRM performed in today's treatment     Ramiro Is progressing well towards her goals.   Patient prognosis is Good.     Patient will continue to benefit from skilled outpatient physical therapy to address the deficits listed in the problem list box on initial evaluation, provide pt/family education and to maximize pt's level of independence in the home and community environment.     Patient's spiritual, cultural and educational needs considered and pt agreeable to plan of care and goals.     Anticipated barriers to physical therapy: other comorbidities that are being worked up    Goals:   STG'S: 3 weeks  1. Patient independent in HEP of balance, habituation, head-eye coordination, gaze stabilization, and repositioning.  Exercises to be done Daily.  2. Decrease patient's subjective rating of dizziness to a 4 on 0-10 scale) with walking, driving, return to physical activity/walking in the park or gym.   3.  Decrease patient's subjective rating of dizziness to a  2  (on 0-10  scale) or less as baseline.     LTG'S  : 6 weeks  1. Patient able to ambulate in community safely without assistive device, on varied terrainwith head movement, without LOB or c/o dizziness.  2. Patient's subjective rating of dizziness will decreased to 0-2 on 0-10 scale  3. Patient able to perform caring for young daughter without LOB or c/o dizziness  4. Patient able to drive independently without head movement restrictions without c/o dizziness  5. Pt to be I with self management of condition and progression vestibular program for maintenance.    Plan   Plan of care Certification: 5/24/2024 to 07/24/2024.     Outpatient Physical Therapy 1 times weekly for 6 weeks to include the following interventions: Gait Training, Neuromuscular Re-ed, Patient Education, Self Care, Therapeutic Activities, Therapeutic Exercise, and CRM .     Katina Mishra, PT ,DPT, MHA, CLT, CEAS

## 2024-05-24 ENCOUNTER — CLINICAL SUPPORT (OUTPATIENT)
Dept: REHABILITATION | Facility: OTHER | Age: 39
End: 2024-05-24
Attending: FAMILY MEDICINE
Payer: MEDICAID

## 2024-05-24 DIAGNOSIS — H81.11 BENIGN PAROXYSMAL POSITIONAL VERTIGO OF RIGHT EAR: Primary | ICD-10-CM

## 2024-05-24 PROCEDURE — 97110 THERAPEUTIC EXERCISES: CPT | Mod: PN | Performed by: PHYSICAL THERAPIST

## 2024-06-06 ENCOUNTER — CLINICAL SUPPORT (OUTPATIENT)
Dept: REHABILITATION | Facility: OTHER | Age: 39
End: 2024-06-06
Payer: MEDICAID

## 2024-06-06 DIAGNOSIS — R27.8 COORDINATION IMPAIRMENT: ICD-10-CM

## 2024-06-06 DIAGNOSIS — M62.89 PELVIC FLOOR DYSFUNCTION: Primary | ICD-10-CM

## 2024-06-06 DIAGNOSIS — H81.11 BENIGN PAROXYSMAL POSITIONAL VERTIGO OF RIGHT EAR: Primary | ICD-10-CM

## 2024-06-06 PROCEDURE — 97110 THERAPEUTIC EXERCISES: CPT | Mod: PN | Performed by: PHYSICAL THERAPIST

## 2024-06-06 NOTE — PROGRESS NOTES
Pelvic Health Physical Therapy   Treatment Note     Name: Ramiro Rubio  Clinic Number: [id number]    Therapy Diagnosis:   Encounter Diagnoses   Name Primary?    Pelvic floor dysfunction Yes    Coordination impairment        Referring Provider: Wild Leiva Jr., MD    Visit Date: 6/6/2024    Referring Provider Orders: PT Eval and Treat  Medical Diagnosis from Referral: OAB (overactive bladder) [N32.81]   Evaluation Date: 4/15/2024  Authorization Period Expiration: 12/31/2024           Plan of Care Expiration: 7/15/2024  Visit # / Visits authorized: 2 (3/12)  FOTO: 1 (4/15/2024)    Cancelled Visits: -  No Show Visits: -    Time In: 10:30  Time Out: 10:50  Total Billable Time: 15 minutes    Precautions: standard    Subjective     Ramiro reports continued urgency and urge incontinence. She practices urge suppression, but it doesn't always work. No fecal incontinence since last visit.  She notes a relationship between neurological symptoms and urinary/bowel symptoms, and she doesn't believe her pelvic floor is driving leakage.    She was compliant with home exercise program.  Response to previous treatment: no adverse effect  Functional change: none reported    Pain: none reported    Objective     Ramiro received the following interventions during the treatment session:   (TrA = transverse abdominis, PFM = pelvic floor muscle, sEMG = surface electromyography)  Pt consented to pelvic floor muscle assessment and treatment on 5/22/24. See EMR.    Therapeutic activities to improve functional performance for 15 minutes -  [] Pelvic floor and abdominal wall assessment - see below  [x] Instruction on urge suppression techniques  [x] HEP building/HEP review    Neuromuscular re-education activities to develop balance, coordination, kinesthetic sense, motor control, proprioception, and posture for 0 minutes -   [] PFM squeeze without muscle substitution - able to demonstrate correctly with verbal cues  [] Bent knee fall outs with  focus on pelvic girdle stability (R&L) - easy  [] TrA brace + straight leg raise (R&L) - easy  [] PFM squeeze + bridging, x20 with 5-second hold    Home Exercises and Patient Education     Patient Education: progression of plan of care, plan for next session, pelvic floor anatomy & function, relationship between TrA & PFM, relationship between hips & PFM, urge suppression, and etiology of urinary urgency    Home Exercise Program (4/15/24): urge suppression  Home Exercise Program Updates (5/22/24): britta franco + bridging  HEP up dates (6/6/24): none    Exercises were reviewed, and Ramiro was able to demonstrate them prior to the end of the session, as needed. Ramiro demonstrated good understanding of the education provided.     Assessment     Pt tolerated treatment session well, with good understanding of education provided. Relatively normal pelvic floor assessment last visit and lack of resolution with symptoms after practicing urge suppression, as well as relationship to neurological symptoms suggest pelvic floor dysfunction is not driving pt's incontinence. Pt will follow up with neurology and rheumatology to help determine underlying cause and can return to therapy if needed.     Ramiro is progressing well towards her goals.   Pt prognosis: good    Pt will continue to benefit from skilled outpatient physical therapy to address the deficits listed in the problem list box on initial evaluation, provide pt/family education and to maximize pt's level of independence in the home and community environment.     Pt's spiritual, cultural and educational needs considered and pt agreeable to plan of care and goals.  Anticipated barriers to physical therapy: none      Short Term Goals: 6 weeks   Pt to report >50% improvement in urine leakage - PARTIALLY MET  Pt to report >50% improvement in fecal leakage - PARITALLY MET  Pt to verbalize urge suppression strategies for improved control over urgency and urge urinary incontinence  - MET  Pt to be independent with introductory home exercise program - MET      Long Term Goals: 12 weeks   Pt to deny urinary incontinence to demonstrate improved pelvic floor coordination - NOT MET   Pt to deny fecal incontinence to demonstrate improved pelvic floor coordination - NOT MET   Pt to score no more than 25% impairment on the Bowel Leakage FOTO survey to demonstrate reduced impairment due to pelvic floor dysfunction - NOT MET   Pt to be independent with advanced home exercise program - NOT MET      Plan     Therapy on hold after today's visit      Pura Vernon, PT, DPT

## 2024-06-06 NOTE — PROGRESS NOTES
"[facility]   Physical Therapy Treatment Note      Name: Ramiro Rubio  Clinic Number: [id number]    Therapy Diagnosis:   Encounter Diagnosis   Name Primary?    Benign paroxysmal positional vertigo of right ear Yes     Physician: Sabas Hutchinson MD    Visit Date: 6/6/2024    Referring Provider Orders: PT Eval and Treat  Medical Diagnosis from Referral: OAB (overactive bladder) [N32.81]   Evaluation Date: 4/15/2024  Authorization Period Expiration: 12/31/2024           Plan of Care Expiration: 7/15/2024  Visit # / Visits authorized: 2/pending  FOTO: 1 (4/15/2024)     Time In: 1050  Time Out: 1126  Total Billable Time: 36 minutes     Precautions: standard    Subjective     Patient reports: dizziness went away for a week; able to lay down and look to the right without spinning.  She was compliant with home exercise program.  Response to previous treatment: improvements since last treatment session but returned yesterday with supine position  Functional change: felt great with daily activities and thought symptoms gone but returned yesterday; pt reports that she felt motion sick while riding in the back seat of the car; Pt reports that she was able to walk for an hour without loss of balance.    Symptoms: 0/10 currently; feels a little off and unsteady on her feet    Objective      Objective Measures updated at progress report unless specified.     SOFI SENSORY TESTING:   (P= Pass, F= Fail; note any sway; hold each position for 30")  Condition 1: (firm surface/feet together/eyes open) P  Condition 2: (firm surface/feet together/eyes closed) P  Condition 3: (firm surface/feet in tandem/eyes open) P  Condition 4: (firm surface/feet in tandem/eyes closed) 23, 3, 7  Condition 5: (soft surface/feet together/eyes open) P  Condition 6: (soft surface/feet together/eyes closed) P     POSITIONAL CANAL TESTING  Looking for nystagmus (slow phase followed by quick phase to the affected side for BPPV)   "   Supported Gary Hallpike (posterior / CL anterior)              Right : negative   Left: negative  Treatment Performed: Right Eply x 3 reps with mild rocking sensation reported on first rep in first position only.   Symptoms: feels nothing but concerned about feeling off balance throughout day.    Treatment     Ramiro received the treatments listed below:      therapeutic exercises to develop strength, endurance, ROM, flexibility, posture, and core stabilization and Balance, Coordination, Kinesthetic, Sense, Proprioception, and Posture for 36 minutes.   The following activities were included:  +Objective measures as documented above  Pt independently return demonstrated the Right Eply maneuver.    Patient Education and Home Exercises       Education provided:   - increase physical activity to daily walks 20-30 minutes staying in lower HR zones  -Vestibular rehab  -Recovery for CRM  Education on self CRm    Written Home Exercises Provided: Patient instructed to cont prior HEP. Exercises were reviewed and Ramiro was able to demonstrate them prior to the end of the session.  Ramiro demonstrated good  understanding of the education provided. See Electronic Medical Record under Patient Instructions for exercises provided during therapy sessions    Assessment     Ramiro is a 38 y.o. female referred to outpatient Physical Therapy with a medical diagnosis of Left posterior BPPV canalithiasis . Patient presents with positionally provoked vertigo.  Pt appears to have responded well to CRM performed in today's treatment.    Ramiro Is progressing well towards her goals.   Patient prognosis is Good.     Patient will continue to benefit from skilled outpatient physical therapy to address the deficits listed in the problem list box on initial evaluation, provide pt/family education and to maximize pt's level of independence in the home and community environment.     Patient's spiritual, cultural and educational needs considered and pt  agreeable to plan of care and goals.     Anticipated barriers to physical therapy: other comorbidities that are being worked up     Goals:   STG'S: 3 weeks  1. Patient independent in HEP of balance, habituation, head-eye coordination, gaze stabilization, and repositioning.  Exercises to be done daily Met  2. Decrease patient's subjective rating of dizziness to a 4 on 0-10 scale) with walking, driving, return to physical activity/walking in the park or gym. Met  3.  Decrease patient's subjective rating of dizziness to a  2  (on 0-10 scale) or less as baseline.Met     LTG'S  : 6 weeks  1. Patient able to ambulate in community safely without assistive device, on varied terrainwith head movement, without LOB or c/o dizziness. Met  2. Patient's subjective rating of dizziness will decreased to 0-2 on 0-10 scale  Progressing, Not Met  3. Patient able to perform care for young daughter without LOB or c/o dizziness Progressing, Not Met  4. Patient able to drive independently without head movement restrictions without c/o dizziness  Progressing, Not Met  5. Pt to be I with self management of condition and progression vestibular program for maintenance. Progressing, Not Met    Plan   Plan of care Certification: 5/24/2024 to 07/24/2024.     Outpatient Physical Therapy 1 times weekly for 6 weeks to include the following interventions: Gait Training, Neuromuscular Re-ed, Patient Education, Self Care, Therapeutic Activities, Therapeutic Exercise, and CRM .      Katina Mishra, PT ,DPT, MHA, CLT, CEAS

## 2024-06-17 ENCOUNTER — OFFICE VISIT (OUTPATIENT)
Dept: RHEUMATOLOGY | Facility: CLINIC | Age: 39
End: 2024-06-17
Payer: MEDICAID

## 2024-06-17 VITALS
HEART RATE: 66 BPM | SYSTOLIC BLOOD PRESSURE: 115 MMHG | DIASTOLIC BLOOD PRESSURE: 75 MMHG | HEIGHT: 60 IN | BODY MASS INDEX: 32.28 KG/M2 | WEIGHT: 164.44 LBS

## 2024-06-17 DIAGNOSIS — R76.8 POSITIVE ANA (ANTINUCLEAR ANTIBODY): Primary | ICD-10-CM

## 2024-06-17 DIAGNOSIS — R27.8 COORDINATION IMPAIRMENT: ICD-10-CM

## 2024-06-17 PROCEDURE — 99213 OFFICE O/P EST LOW 20 MIN: CPT | Mod: PBBFAC | Performed by: INTERNAL MEDICINE

## 2024-06-17 PROCEDURE — 3008F BODY MASS INDEX DOCD: CPT | Mod: CPTII,,, | Performed by: INTERNAL MEDICINE

## 2024-06-17 PROCEDURE — 1159F MED LIST DOCD IN RCRD: CPT | Mod: CPTII,,, | Performed by: INTERNAL MEDICINE

## 2024-06-17 PROCEDURE — 3074F SYST BP LT 130 MM HG: CPT | Mod: CPTII,,, | Performed by: INTERNAL MEDICINE

## 2024-06-17 PROCEDURE — 99204 OFFICE O/P NEW MOD 45 MIN: CPT | Mod: S$PBB,,, | Performed by: INTERNAL MEDICINE

## 2024-06-17 PROCEDURE — 1160F RVW MEDS BY RX/DR IN RCRD: CPT | Mod: CPTII,,, | Performed by: INTERNAL MEDICINE

## 2024-06-17 PROCEDURE — 99999 PR PBB SHADOW E&M-EST. PATIENT-LVL III: CPT | Mod: PBBFAC,,, | Performed by: INTERNAL MEDICINE

## 2024-06-17 PROCEDURE — 3078F DIAST BP <80 MM HG: CPT | Mod: CPTII,,, | Performed by: INTERNAL MEDICINE

## 2024-06-17 NOTE — PROGRESS NOTES
History of present illness:  38-year-old female with a history of pelvic floor dysfunction.  In April she developed what sounded like a viral illness with GI symptoms.  She subsequently developed vertigo.  She had an MRI of the brain which showed several white matter lesions.  The vertigo is doing better.  She is still going to therapy.  She developed a tremor in the hands.  She has intermittent numbness in the left arm.  As part of the workup for MS she had a positive ELVIA and is referred to see me for that reason.    She has had no unexplained fevers.  She complains of intermittent bitemporal headaches.  She has had no rash, photosensitivity, alopecia, conjunctivitis, oral ulcers, dry eye or mouth, dysphagia, pleurisy, chronic or bloody diarrhea, vaginal discharge or ulcers.  She does state that her fingers occasionally turn purple on exposure to the cold but do not turn blue, white, or red.  She has had no digital ulcers.  She has no joint pain or arthritis.  She has no thrombophlebitis.  She is a  2 para 1 with a termination.  Her mother has thyroid disease but there is no other family history of autoimmune or neurologic disease.      Systems review:   General: Weight has been stable   Respiratory:  No chronic cough or shortness of breath   GI:  No abdominal pain or peptic ulcer disease.  No liver problems.    :  No kidney or bladder problems   Musculoskeletal: Full range of motion of all joints.  No synovitis.  Neurologic: Normal muscle strength testing  Laboratory:  ELVIA positive 1:640, centromere pattern with otherwise negative ELVIA profile.    Assessment:   1. Possible MS   2. Positive ELVIA with a centromere pattern.  Clinically she has no signs or symptoms to suggest limited cutaneous systemic sclerosis, which is the disease associated with anticentromere antibody.    Plans:   1. No further diagnostic workup needed at this time   2. I did not give her regular return appointment but told her if she  develops any new signs or symptoms I would be happy to re-evaluate her in the future.    Answers submitted by the patient for this visit:  Rheumatology Questionnaire (Submitted on 6/15/2024)  fever: No  eye redness: No  mouth sores: No  headaches: No  shortness of breath: No  chest pain: No  trouble swallowing: No  diarrhea: No  constipation: No  unexpected weight change: No  Bruises or bleeds easily: No  cough: No

## 2024-06-18 ENCOUNTER — CLINICAL SUPPORT (OUTPATIENT)
Dept: REHABILITATION | Facility: OTHER | Age: 39
End: 2024-06-18
Payer: MEDICAID

## 2024-06-18 DIAGNOSIS — H81.11 BENIGN PAROXYSMAL POSITIONAL VERTIGO OF RIGHT EAR: Primary | ICD-10-CM

## 2024-06-18 PROCEDURE — 97110 THERAPEUTIC EXERCISES: CPT | Mod: PN | Performed by: PHYSICAL THERAPIST

## 2024-06-18 NOTE — PROGRESS NOTES
[facility]   Physical Therapy Treatment Note      Name: Ramiro Rubio  Clinic Number: [id number]    Therapy Diagnosis:   Encounter Diagnosis   Name Primary?    Benign paroxysmal positional vertigo of right ear Yes       Physician: Sabas Hutchinson MD    Visit Date: 6/18/2024    Referring Provider Orders: PT Eval and Treat  Medical Diagnosis from Referral: OAB (overactive bladder) [N32.81]   Evaluation Date: 4/15/2024  Authorization Period Expiration: 12/31/2024           Plan of Care Expiration: 7/15/2024  Visit # / Visits authorized: 3/12  FOTO: 1 (4/15/2024)     Time In: 1109  Time Out: 1139  Total Billable Time: 30 minutes     Precautions: standard    Subjective     Patient reports: went to Neurologist who referred to Rheumatologist; Still being worked up for MS; No dizziness and imbalance getting better  She was compliant with home exercise program.difficulty with tandem standing balance  Response to previous treatment: no symptoms since last week  Functional change: feels better but still taking it slow    Symptoms: 0/10     Objective      Objective Measures updated at progress report unless specified.     Treatment     Ramiro received the treatments listed below:      therapeutic exercises to develop strength, endurance, ROM, flexibility, posture, and core stabilization and Balance, Coordination, Kinesthetic, Sense, Proprioception, and Posture for 30 minutes.   The following activities were included:  +SLS with 4# wt reaching to floor and then over head single arm press (diagonals)  +Trampoline x 15 minutes with Saccades, Vertical, and Horizontal HTs with target; Symptoms: feeling a little imbalanced.    Patient Education and Home Exercises       Education provided:   - increase physical activity to daily walks 20-30 minutes staying in lower HR zones  -Vestibular rehab  -Recovery for CRM  -Education on self CRm    Written Home Exercises Provided: Patient instructed to cont prior HEP.  Exercises were reviewed and Ramiro was able to demonstrate them prior to the end of the session.  Ramiro demonstrated good  understanding of the education provided. See Electronic Medical Record under Patient Instructions for exercises provided during therapy sessions    Assessment     Ramiro is a 38 y.o. female referred to outpatient Physical Therapy with a medical diagnosis of Left posterior BPPV canalithiasis . Patient presents with positionally provoked vertigo.  Pt appears to have responded well to vestibular treatment and may be dc from skilled PT at next session if symptoms remain controlled as she returns to regular activities    Ramiro Is progressing well towards her goals.   Patient prognosis is Good.     Patient will continue to benefit from skilled outpatient physical therapy to address the deficits listed in the problem list box on initial evaluation, provide pt/family education and to maximize pt's level of independence in the home and community environment.     Patient's spiritual, cultural and educational needs considered and pt agreeable to plan of care and goals.     Anticipated barriers to physical therapy: other comorbidities that are being worked up     Goals:   STG'S: 3 weeks  1. Patient independent in HEP of balance, habituation, head-eye coordination, gaze stabilization, and repositioning.  Exercises to be done daily Met  2. Decrease patient's subjective rating of dizziness to a 4 on 0-10 scale) with walking, driving, return to physical activity/walking in the park or gym. Met  3.  Decrease patient's subjective rating of dizziness to a  2  (on 0-10 scale) or less as baseline.Met     LTG'S  : 6 weeks  1. Patient able to ambulate in community safely without assistive device, on varied terrainwith head movement, without LOB or c/o dizziness. Met  2. Patient's subjective rating of dizziness will decreased to 0-2 on 0-10 scale  Met  3. Patient able to perform care for young daughter without LOB or c/o  dizziness Met  4. Patient able to drive independently without head movement restrictions without c/o dizziness Met  5. Pt to be I with self management of condition and progression vestibular program for maintenance. Met    Plan   Plan of care Certification: 5/24/2024 to 07/24/2024.     *Plan to f/u in 2 weeks; if symptoms remain controlled will dc to [facility]    Outpatient Physical Therapy 1 times weekly for 6 weeks to include the following interventions: Gait Training, Neuromuscular Re-ed, Patient Education, Self Care, Therapeutic Activities, Therapeutic Exercise, and CRM .      Katina Mishra, PT ,DPT, MHA, CLT, CEAS

## 2024-08-28 ENCOUNTER — TELEPHONE (OUTPATIENT)
Dept: RHEUMATOLOGY | Facility: CLINIC | Age: 39
End: 2024-08-28
Payer: MEDICAID

## 2024-09-04 ENCOUNTER — OFFICE VISIT (OUTPATIENT)
Dept: RHEUMATOLOGY | Facility: CLINIC | Age: 39
End: 2024-09-04
Payer: MEDICAID

## 2024-09-04 ENCOUNTER — LAB VISIT (OUTPATIENT)
Dept: LAB | Facility: HOSPITAL | Age: 39
End: 2024-09-04
Attending: INTERNAL MEDICINE
Payer: MEDICAID

## 2024-09-04 VITALS
BODY MASS INDEX: 33.5 KG/M2 | WEIGHT: 171.5 LBS | SYSTOLIC BLOOD PRESSURE: 108 MMHG | DIASTOLIC BLOOD PRESSURE: 76 MMHG | HEART RATE: 75 BPM

## 2024-09-04 DIAGNOSIS — R76.8 POSITIVE ANA (ANTINUCLEAR ANTIBODY): ICD-10-CM

## 2024-09-04 DIAGNOSIS — M62.838 MUSCLE SPASM: Primary | ICD-10-CM

## 2024-09-04 DIAGNOSIS — M62.838 MUSCLE SPASM: ICD-10-CM

## 2024-09-04 DIAGNOSIS — H73.892: ICD-10-CM

## 2024-09-04 LAB — CK SERPL-CCNC: 139 U/L (ref 20–180)

## 2024-09-04 PROCEDURE — 99213 OFFICE O/P EST LOW 20 MIN: CPT | Mod: PBBFAC | Performed by: INTERNAL MEDICINE

## 2024-09-04 PROCEDURE — 99999 PR PBB SHADOW E&M-EST. PATIENT-LVL III: CPT | Mod: PBBFAC,,, | Performed by: INTERNAL MEDICINE

## 2024-09-04 PROCEDURE — 3078F DIAST BP <80 MM HG: CPT | Mod: CPTII,,, | Performed by: INTERNAL MEDICINE

## 2024-09-04 PROCEDURE — 36415 COLL VENOUS BLD VENIPUNCTURE: CPT | Performed by: INTERNAL MEDICINE

## 2024-09-04 PROCEDURE — 82550 ASSAY OF CK (CPK): CPT | Performed by: INTERNAL MEDICINE

## 2024-09-04 PROCEDURE — 3074F SYST BP LT 130 MM HG: CPT | Mod: CPTII,,, | Performed by: INTERNAL MEDICINE

## 2024-09-04 PROCEDURE — 1160F RVW MEDS BY RX/DR IN RCRD: CPT | Mod: CPTII,,, | Performed by: INTERNAL MEDICINE

## 2024-09-04 PROCEDURE — 99214 OFFICE O/P EST MOD 30 MIN: CPT | Mod: S$PBB,,, | Performed by: INTERNAL MEDICINE

## 2024-09-04 PROCEDURE — 3008F BODY MASS INDEX DOCD: CPT | Mod: CPTII,,, | Performed by: INTERNAL MEDICINE

## 2024-09-04 PROCEDURE — 82085 ASSAY OF ALDOLASE: CPT | Performed by: INTERNAL MEDICINE

## 2024-09-04 PROCEDURE — 1159F MED LIST DOCD IN RCRD: CPT | Mod: CPTII,,, | Performed by: INTERNAL MEDICINE

## 2024-09-04 RX ORDER — FERROUS SULFATE 325(65) MG
TABLET ORAL
COMMUNITY

## 2024-09-04 RX ORDER — CHOLECALCIFEROL (VITAMIN D3) 50 MCG
TABLET ORAL
COMMUNITY

## 2024-09-06 LAB — ALDOLASE SERPL-CCNC: 4 U/L (ref 1.2–7.6)

## 2024-09-06 NOTE — PROGRESS NOTES
History of present illness:  38-year-old female I saw 1 time in June.  She has a history of pelvic floor dysfunction.  She had what sounded like a viral illness with GI symptoms in April.  She subsequently developed vertigo.  She had an MRI of the brain which showed several white matter lesions.  Her vertigo had actually improved.  She was doing physical therapy.  She also had numbness in the left arm.  There was concern for MS.  As part of the workup she had a positive ELVIA.  I evaluated her for that reason.  It was actually an anticentromere pattern.  Clinically she had no evidence of a connective tissue disease.  I did not do any further diagnostic workup and referred her back to Neurology.    Since that last visit she has been followed by Neurology.  Workup for MS has been negative.  Antibody testing was negative.  She was asked to come back and see me again.    She complains of a scaly rash of her scalp.  She was seen by Dermatology and was told it was not psoriasis.  She also has an occasional rash on her hands.  Her balance has improved.  She has occasional spasm on the left side.  She has had no fever, headache, conjunctivitis, oral ulcers, dry eye or mouth, Raynaud's phenomena, pleurisy, shortness of breath, dysphagia, chronic or bloody diarrhea, joint pain or arthritis.    Physical examination:   Skin:  No rashes she has no evidence of skin tightness.  ENT: Adequate tears in saliva.  No conjunctivitis or oral ulcers.  She is able to wrinkle her forehead.  She has normal mouth opening.    Chest: Clear to auscultation  Cardiac: No murmurs, gallops, rubs   Abdomen: No organomegaly or masses.  No tenderness to palpation   Extremities: No pedal edema.  No sclerodactyly.  No digital ulcers.  Musculoskeletal: Full range of motion of all joints.  No synovitis.  No tenderness to palpation.  Neurologic: Normal muscle strength testing    Assessment:  She has a positive anticentromere antibody.  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  The initial presenting complaint is Raynaud's phenomena, she has no evidence.  The other finding is usually distal sclerodactyly.  She has no skin tightness.  A recent article shows that up to 40% of patients with anticentromere antibody may not have an underlying connective tissue disease.  ELVIA tests can also turn positive years before any symptoms develop.    Plans:   1.  Further laboratory studies obtained   2. I informed hurt on what to look out for regarding Raynaud's phenomena and skin tightness   3.  I did not give her regular return appointment but would be happy to see her in follow-up if she develops any new symptoms.    Answers submitted by the patient for this visit:  Rheumatology Questionnaire (Submitted on 9/3/2024)  fever: No  eye redness: No  mouth sores: No  headaches: No  shortness of breath: No  chest pain: No  trouble swallowing: No  diarrhea: No  constipation: No  Bruises or bleeds easily: No  cough: No

## 2024-12-10 ENCOUNTER — OFFICE VISIT (OUTPATIENT)
Dept: OPTOMETRY | Facility: CLINIC | Age: 39
End: 2024-12-10
Payer: MEDICAID

## 2024-12-10 DIAGNOSIS — D31.00 NEVUS OF CONJUNCTIVA, UNSPECIFIED LATERALITY: ICD-10-CM

## 2024-12-10 DIAGNOSIS — H02.886 MEIBOMIAN GLAND DYSFUNCTION (MGD) OF BOTH EYES: Primary | ICD-10-CM

## 2024-12-10 DIAGNOSIS — H02.883 MEIBOMIAN GLAND DYSFUNCTION (MGD) OF BOTH EYES: Primary | ICD-10-CM

## 2024-12-10 PROCEDURE — 1160F RVW MEDS BY RX/DR IN RCRD: CPT | Mod: CPTII,,, | Performed by: OPTOMETRIST

## 2024-12-10 PROCEDURE — 99213 OFFICE O/P EST LOW 20 MIN: CPT | Mod: PBBFAC | Performed by: OPTOMETRIST

## 2024-12-10 PROCEDURE — 1159F MED LIST DOCD IN RCRD: CPT | Mod: CPTII,,, | Performed by: OPTOMETRIST

## 2024-12-10 PROCEDURE — 99999 PR PBB SHADOW E&M-EST. PATIENT-LVL III: CPT | Mod: PBBFAC,,, | Performed by: OPTOMETRIST

## 2024-12-10 PROCEDURE — 92004 COMPRE OPH EXAM NEW PT 1/>: CPT | Mod: S$PBB,,, | Performed by: OPTOMETRIST

## 2024-12-10 RX ORDER — CETIRIZINE HYDROCHLORIDE 10 MG/1
1 TABLET ORAL DAILY
COMMUNITY

## 2024-12-10 RX ORDER — MECLIZINE HYDROCHLORIDE 25 MG/1
TABLET ORAL
COMMUNITY

## 2024-12-10 RX ORDER — CLOBETASOL PROPIONATE 0.5 MG/ML
SOLUTION TOPICAL
COMMUNITY
Start: 2024-12-03

## 2024-12-10 RX ORDER — ONDANSETRON 4 MG/1
TABLET, ORALLY DISINTEGRATING ORAL
COMMUNITY

## 2024-12-10 RX ORDER — ERGOCALCIFEROL 1.25 MG/1
50000 CAPSULE ORAL
COMMUNITY

## 2024-12-10 RX ORDER — AMOXICILLIN 875 MG/1
875 TABLET, FILM COATED ORAL 2 TIMES DAILY
COMMUNITY
Start: 2024-11-06

## 2024-12-11 NOTE — PROGRESS NOTES
"HPI    [place]: 2 yrs ago in [place]  Last DFE: 2 yrs  Chief complaint (CC): 37 yo F presents today for annual eye exam. Pt c/o   multiple styes BUL/BLL and "spots of discoloration" OU. Pt. States she   went to her PCP in August and she was referred here. Pt denies any other   ocular or visual complaints today.   Glasses? No  Contacts? No  H/o eye surgery, injections or laser: No  H/o eye injury: H/o trauma needing stiches right eyelid  Known eye conditions? None  Family h/o eye conditions? None  Eye gtts? None      (-) Flashes (-)  Floaters (-) Mucous   (-)  Tearing (-) Itching (-) Burning   (-) Headaches (-) Eye Pain/discomfort (-) Irritation   (-)  Redness (-) Double vision (-) Blurry vision    CL Exam: No    Diabetic? -  A1c? No results found for: "LABA1C", "HGBA1C"  Last edited by Sanjay Howell, OD on 12/11/2024  5:22 PM.            Assessment /Plan     For exam results, see Encounter Report.        Meibomian gland dysfunction (MGD) of both eyes   - Advised artificial tears QID OU, lid scrubs BID (Ocusoft vs. Baby shampoo) and warm compresses BID-TID for 10 minutes each time followed by a gentle lid massage.     Nevus of conjunctiva, unspecified laterality   - Pt educated on findings. Monitor.                  "

## 2025-01-13 NOTE — HOSPITAL COURSE
PPD#1: c/o bilateral, symmetric lower leg swelling  PPD#2: Pt doing well, no major complaints.  Believes that leg swelling is improving  
done

## 2025-01-17 ENCOUNTER — LAB VISIT (OUTPATIENT)
Dept: LAB | Facility: HOSPITAL | Age: 40
End: 2025-01-17
Attending: INTERNAL MEDICINE
Payer: MEDICAID

## 2025-01-17 ENCOUNTER — OFFICE VISIT (OUTPATIENT)
Dept: RHEUMATOLOGY | Facility: CLINIC | Age: 40
End: 2025-01-17
Payer: MEDICAID

## 2025-01-17 VITALS
SYSTOLIC BLOOD PRESSURE: 112 MMHG | DIASTOLIC BLOOD PRESSURE: 71 MMHG | BODY MASS INDEX: 31.86 KG/M2 | HEART RATE: 89 BPM | WEIGHT: 163.13 LBS

## 2025-01-17 DIAGNOSIS — M79.602 LEFT ARM PAIN: ICD-10-CM

## 2025-01-17 DIAGNOSIS — R76.8 POSITIVE ANA (ANTINUCLEAR ANTIBODY): ICD-10-CM

## 2025-01-17 DIAGNOSIS — R20.0 NUMBNESS OF LEFT HAND: ICD-10-CM

## 2025-01-17 DIAGNOSIS — R76.8 POSITIVE ANA (ANTINUCLEAR ANTIBODY): Primary | ICD-10-CM

## 2025-01-17 LAB
C3 SERPL-MCNC: 125 MG/DL (ref 50–180)
C4 SERPL-MCNC: 27 MG/DL (ref 11–44)
CRP SERPL-MCNC: 0.7 MG/L (ref 0–8.2)
ERYTHROCYTE [SEDIMENTATION RATE] IN BLOOD BY PHOTOMETRIC METHOD: 18 MM/HR (ref 0–36)

## 2025-01-17 PROCEDURE — 86146 BETA-2 GLYCOPROTEIN ANTIBODY: CPT | Mod: 59 | Performed by: INTERNAL MEDICINE

## 2025-01-17 PROCEDURE — 99213 OFFICE O/P EST LOW 20 MIN: CPT | Mod: PBBFAC | Performed by: INTERNAL MEDICINE

## 2025-01-17 PROCEDURE — 86140 C-REACTIVE PROTEIN: CPT | Performed by: INTERNAL MEDICINE

## 2025-01-17 PROCEDURE — 1160F RVW MEDS BY RX/DR IN RCRD: CPT | Mod: CPTII,,, | Performed by: INTERNAL MEDICINE

## 2025-01-17 PROCEDURE — 1159F MED LIST DOCD IN RCRD: CPT | Mod: CPTII,,, | Performed by: INTERNAL MEDICINE

## 2025-01-17 PROCEDURE — 86160 COMPLEMENT ANTIGEN: CPT | Mod: 59 | Performed by: INTERNAL MEDICINE

## 2025-01-17 PROCEDURE — 3008F BODY MASS INDEX DOCD: CPT | Mod: CPTII,,, | Performed by: INTERNAL MEDICINE

## 2025-01-17 PROCEDURE — 3074F SYST BP LT 130 MM HG: CPT | Mod: CPTII,,, | Performed by: INTERNAL MEDICINE

## 2025-01-17 PROCEDURE — 85613 RUSSELL VIPER VENOM DILUTED: CPT | Performed by: INTERNAL MEDICINE

## 2025-01-17 PROCEDURE — 86235 NUCLEAR ANTIGEN ANTIBODY: CPT | Mod: 59 | Performed by: INTERNAL MEDICINE

## 2025-01-17 PROCEDURE — 86147 CARDIOLIPIN ANTIBODY EA IG: CPT | Mod: 59 | Performed by: INTERNAL MEDICINE

## 2025-01-17 PROCEDURE — 99214 OFFICE O/P EST MOD 30 MIN: CPT | Mod: S$PBB,,, | Performed by: INTERNAL MEDICINE

## 2025-01-17 PROCEDURE — 85652 RBC SED RATE AUTOMATED: CPT | Performed by: INTERNAL MEDICINE

## 2025-01-17 PROCEDURE — 3078F DIAST BP <80 MM HG: CPT | Mod: CPTII,,, | Performed by: INTERNAL MEDICINE

## 2025-01-17 PROCEDURE — 86038 ANTINUCLEAR ANTIBODIES: CPT | Performed by: INTERNAL MEDICINE

## 2025-01-17 PROCEDURE — 99999 PR PBB SHADOW E&M-EST. PATIENT-LVL III: CPT | Mod: PBBFAC,,, | Performed by: INTERNAL MEDICINE

## 2025-01-17 PROCEDURE — 86039 ANTINUCLEAR ANTIBODIES (ANA): CPT | Performed by: INTERNAL MEDICINE

## 2025-01-17 PROCEDURE — 36415 COLL VENOUS BLD VENIPUNCTURE: CPT | Performed by: INTERNAL MEDICINE

## 2025-01-17 PROCEDURE — 86160 COMPLEMENT ANTIGEN: CPT | Performed by: INTERNAL MEDICINE

## 2025-01-17 RX ORDER — NAPROXEN 500 MG/1
500 TABLET ORAL
COMMUNITY
Start: 2024-12-28

## 2025-01-19 LAB
ANA PATTERN 1: NORMAL
ANA SER QL IF: POSITIVE
ANA TITR SER IF: NORMAL {TITER}

## 2025-01-20 LAB
B2 GLYCOPROT1 IGA SER QL: 3.1 U/ML
B2 GLYCOPROT1 IGG SER QL: 1.2 U/ML
B2 GLYCOPROT1 IGM SER QL: <2.4 U/ML
CARDIOLIPIN IGG SER IA-ACNC: <9.4 GPL (ref 0–14.99)
CARDIOLIPIN IGM SER IA-ACNC: <9.4 MPL (ref 0–12.49)

## 2025-01-22 NOTE — PROGRESS NOTES
History of present illness:  39-year-old female I saw initially in June.  She has a history of pelvic floor dysfunction.  She had what sounded like a viral illness with GI symptoms in April.  She subsequently developed vertigo.  She had an MRI of the brain which showed several white matter lesions.  Her vertigo had actually improved.  She was doing physical therapy.  She also had numbness in the left arm.  There was concern for MS.  As part of the workup she had a positive ELVIA.  I evaluated her for that reason.  It was actually an anticentromere pattern.  Clinically she had no evidence of a connective tissue disease.  I did not do any further diagnostic workup and referred her back to Neurology.     Since that last visit she has been followed by Neurology.  Workup for MS has been negative.  Antibody testing was negative.  She was asked to come back and see me again.     She complains of a scaly rash of her scalp.  She was seen by Dermatology and was told it was not psoriasis.  She also has an occasional rash on her hands.  Her balance has improved.  She has occasional spasm on the left side.  She has had no fever, headache, conjunctivitis, oral ulcers, dry eye or mouth, Raynaud's phenomena, pleurisy, shortness of breath, dysphagia, chronic or bloody diarrhea, joint pain or arthritis.  I saw her again in September and her exam was negative.    She comes in now because of numbness in her left hand.  It started in mid December.  She actually had numbness on the entire left side of her body but it is now only in the left hand.  She states her arm felt type but she did not see any actual swelling.  She was seen in the emergency room.  She was placed on Naprosyn which helped her pain.  She had no antecedent URI.  She has had no fever or headache.  Her spasms are actually doing better.  She denies other recent medical problems.      Physical examination:  Skin: No rashes   ENT:  Adequate tears in saliva.  No  conjunctivitis or oral ulcers.  Chest:  Clear to auscultation   Cardiac:  No murmurs, gallops, rubs.  No carotid bruits.  Abdomen: No organomegaly or masses.  No tenderness to palpation   Extremities:  No pedal edema  Musculoskeletal:  Cervical spine is unremarkable.  She has pain on range of motion of the left shoulder.  She is tender over the subacromial bursa.  She has good range of motion.  Right shoulder is unremarkable.    She has tenderness in the left elbow but has good range of motion.  She has no swelling.  Right elbow is unremarkable.    Wrists and hands are within normal limits.  She has negative Tinel sign at the wrists and elbow.  She had negative Phalen's tests.  Hips, knees, ankles, small joints the feet are unremarkable.  Neurologic: She has normal muscle strength testing    Assessment:  Her symptoms are somewhat atypical but I wonder if this represents carpal tunnel syndrome     Plans:  1. Laboratory studies obtained   2. I recommend following up with her neurologist   3. Return to see me as needed.      Answers submitted by the patient for this visit:  Rheumatology Questionnaire (Submitted on 1/10/2025)  fever: No  eye redness: No  mouth sores: No  headaches: No  shortness of breath: No  chest pain: No  trouble swallowing: No  diarrhea: No  constipation: No  unexpected weight change: No  genital sore: No  dysuria: No  During the last 3 days, have you had a skin rash?: No  Bruises or bleeds easily: No  cough: No

## 2025-01-23 LAB
CONFIRM DRVVT STA-STACLOT: NORMAL S
DRVVT SCREEN TO CONFIRM RATIO: NORMAL {RATIO}
HEPARIN NT PPP QL: NORMAL
LA 3 SCREEN W REFLEX-IMP: NORMAL
LMW HEPARIN IND PLT AB SER QL: NORMAL
MIXING DRVVT/NORMAL: NORMAL %
NEUTRALIZED DRVVT SCREEN RATIO: NORMAL
PROTHROMBIN TIME: 13.7 S (ref 12–15.5)
SCREEN APTT/NORMAL: 1.03
SCREEN APTT/NORMAL: NORMAL
SCREEN DRVVT/NORMAL: 0.81 %
THROMBIN TIME: NORMAL S

## 2025-01-24 LAB
ANTI SM ANTIBODY: 0.12 RATIO (ref 0–0.99)
ANTI SM/RNP ANTIBODY: 0.18 RATIO (ref 0–0.99)
ANTI-SM INTERPRETATION: NEGATIVE
ANTI-SM/RNP INTERPRETATION: NEGATIVE
ANTI-SSA ANTIBODY: 0.1 RATIO (ref 0–0.99)
ANTI-SSA INTERPRETATION: NEGATIVE
ANTI-SSB ANTIBODY: 0.11 RATIO (ref 0–0.99)
ANTI-SSB INTERPRETATION: NEGATIVE
DSDNA AB SER-ACNC: NORMAL [IU]/ML

## 2025-04-14 ENCOUNTER — PATIENT OUTREACH (OUTPATIENT)
Dept: ADMINISTRATIVE | Facility: OTHER | Age: 40
End: 2025-04-14
Payer: MEDICAID

## 2025-04-14 NOTE — PROGRESS NOTES
[facility] - Outreach    Outreach to patient who does not have a PCP on file.  Verified HIPAA with pt. Inquired if pt was interested in establishing care with [facility]   Patient declined. Patient stated she has a current PCP with [facility] .